# Patient Record
Sex: FEMALE | Race: WHITE | NOT HISPANIC OR LATINO
[De-identification: names, ages, dates, MRNs, and addresses within clinical notes are randomized per-mention and may not be internally consistent; named-entity substitution may affect disease eponyms.]

---

## 2017-09-08 ENCOUNTER — RECORD ABSTRACTING (OUTPATIENT)
Age: 77
End: 2017-09-08

## 2017-09-15 ENCOUNTER — APPOINTMENT (OUTPATIENT)
Dept: NEUROLOGY | Facility: CLINIC | Age: 77
End: 2017-09-15
Payer: MEDICARE

## 2017-09-15 PROCEDURE — 93892 TCD EMBOLI DETECT W/O INJ: CPT

## 2017-09-15 PROCEDURE — 93880 EXTRACRANIAL BILAT STUDY: CPT

## 2017-09-15 PROCEDURE — 93886 INTRACRANIAL COMPLETE STUDY: CPT

## 2017-09-22 ENCOUNTER — APPOINTMENT (OUTPATIENT)
Dept: NEUROLOGY | Facility: CLINIC | Age: 77
End: 2017-09-22
Payer: MEDICARE

## 2017-09-22 VITALS
WEIGHT: 252 LBS | SYSTOLIC BLOOD PRESSURE: 170 MMHG | HEIGHT: 66 IN | DIASTOLIC BLOOD PRESSURE: 100 MMHG | BODY MASS INDEX: 40.5 KG/M2 | HEART RATE: 76 BPM

## 2017-09-22 VITALS — WEIGHT: 252 LBS | BODY MASS INDEX: 41.48 KG/M2 | HEIGHT: 65.5 IN

## 2017-09-22 VITALS — SYSTOLIC BLOOD PRESSURE: 171 MMHG | DIASTOLIC BLOOD PRESSURE: 98 MMHG | HEART RATE: 76 BPM

## 2017-09-22 PROCEDURE — 99205 OFFICE O/P NEW HI 60 MIN: CPT

## 2018-03-03 ENCOUNTER — RECORD ABSTRACTING (OUTPATIENT)
Age: 78
End: 2018-03-03

## 2018-04-03 ENCOUNTER — APPOINTMENT (OUTPATIENT)
Dept: NEUROLOGY | Facility: CLINIC | Age: 78
End: 2018-04-03
Payer: MEDICARE

## 2018-04-03 VITALS
SYSTOLIC BLOOD PRESSURE: 175 MMHG | WEIGHT: 250 LBS | HEART RATE: 78 BPM | HEIGHT: 66 IN | DIASTOLIC BLOOD PRESSURE: 95 MMHG | BODY MASS INDEX: 40.18 KG/M2

## 2018-04-03 VITALS — DIASTOLIC BLOOD PRESSURE: 96 MMHG | SYSTOLIC BLOOD PRESSURE: 176 MMHG | HEART RATE: 78 BPM

## 2018-04-03 VITALS — WEIGHT: 250 LBS | HEIGHT: 66 IN | BODY MASS INDEX: 40.18 KG/M2

## 2018-04-03 PROCEDURE — 99215 OFFICE O/P EST HI 40 MIN: CPT

## 2018-04-03 RX ORDER — ENALAPRIL MALEATE 20 MG/1
20 TABLET ORAL TWICE DAILY
Refills: 0 | Status: ACTIVE | COMMUNITY

## 2018-04-19 ENCOUNTER — APPOINTMENT (OUTPATIENT)
Dept: NEUROLOGY | Facility: CLINIC | Age: 78
End: 2018-04-19
Payer: MEDICARE

## 2018-04-19 PROCEDURE — 93892 TCD EMBOLI DETECT W/O INJ: CPT

## 2018-04-19 PROCEDURE — 93886 INTRACRANIAL COMPLETE STUDY: CPT

## 2018-04-19 PROCEDURE — 93880 EXTRACRANIAL BILAT STUDY: CPT

## 2018-10-08 ENCOUNTER — APPOINTMENT (OUTPATIENT)
Dept: NEUROLOGY | Facility: CLINIC | Age: 78
End: 2018-10-08

## 2018-10-17 ENCOUNTER — APPOINTMENT (OUTPATIENT)
Dept: NEUROLOGY | Facility: CLINIC | Age: 78
End: 2018-10-17
Payer: MEDICARE

## 2018-10-17 PROCEDURE — 93880 EXTRACRANIAL BILAT STUDY: CPT

## 2018-10-18 ENCOUNTER — RECORD ABSTRACTING (OUTPATIENT)
Age: 78
End: 2018-10-18

## 2018-10-19 ENCOUNTER — APPOINTMENT (OUTPATIENT)
Dept: NEUROLOGY | Facility: CLINIC | Age: 78
End: 2018-10-19
Payer: MEDICARE

## 2018-10-19 VITALS
HEART RATE: 76 BPM | BODY MASS INDEX: 41.46 KG/M2 | DIASTOLIC BLOOD PRESSURE: 79 MMHG | WEIGHT: 258 LBS | SYSTOLIC BLOOD PRESSURE: 170 MMHG | HEIGHT: 66 IN

## 2018-10-19 VITALS
WEIGHT: 258 LBS | SYSTOLIC BLOOD PRESSURE: 170 MMHG | HEART RATE: 6 BPM | DIASTOLIC BLOOD PRESSURE: 80 MMHG | BODY MASS INDEX: 41.46 KG/M2 | HEIGHT: 66 IN

## 2018-10-19 VITALS — HEART RATE: 76 BPM

## 2018-10-19 PROCEDURE — 99215 OFFICE O/P EST HI 40 MIN: CPT

## 2018-10-19 RX ORDER — DICLOFENAC SODIUM 10 MG/G
1 GEL TOPICAL
Qty: 100 | Refills: 0 | Status: ACTIVE | COMMUNITY
Start: 2018-06-25

## 2019-01-20 ENCOUNTER — RECORD ABSTRACTING (OUTPATIENT)
Age: 79
End: 2019-01-20

## 2019-03-12 ENCOUNTER — APPOINTMENT (OUTPATIENT)
Dept: NEUROLOGY | Facility: CLINIC | Age: 79
End: 2019-03-12
Payer: MEDICARE

## 2019-03-12 VITALS
HEART RATE: 81 BPM | DIASTOLIC BLOOD PRESSURE: 95 MMHG | WEIGHT: 260 LBS | BODY MASS INDEX: 40.81 KG/M2 | HEIGHT: 67 IN | SYSTOLIC BLOOD PRESSURE: 167 MMHG

## 2019-03-12 VITALS
BODY MASS INDEX: 40.81 KG/M2 | HEIGHT: 67 IN | SYSTOLIC BLOOD PRESSURE: 167 MMHG | DIASTOLIC BLOOD PRESSURE: 95 MMHG | HEART RATE: 81 BPM | WEIGHT: 260 LBS

## 2019-03-12 DIAGNOSIS — G47.33 OBSTRUCTIVE SLEEP APNEA (ADULT) (PEDIATRIC): ICD-10-CM

## 2019-03-12 PROCEDURE — 99215 OFFICE O/P EST HI 40 MIN: CPT
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
## 2019-03-12 NOTE — REVIEW OF SYSTEMS
[Feeling Tired] : feeling tired [Dysuria] : no dysuria [Negative] : ENT [FreeTextEntry2] :   probable snoring; some daytime somnolence [FreeTextEntry9] : joint weakness-unstable left knee

## 2019-03-19 ENCOUNTER — APPOINTMENT (OUTPATIENT)
Dept: NEUROLOGY | Facility: CLINIC | Age: 79
End: 2019-03-19
Payer: MEDICARE

## 2019-03-19 ENCOUNTER — APPOINTMENT (OUTPATIENT)
Dept: NEUROLOGY | Facility: CLINIC | Age: 79
End: 2019-03-19

## 2019-03-19 PROCEDURE — 93880 EXTRACRANIAL BILAT STUDY: CPT

## 2019-04-08 ENCOUNTER — APPOINTMENT (OUTPATIENT)
Dept: NEUROLOGY | Facility: CLINIC | Age: 79
End: 2019-04-08

## 2019-05-10 ENCOUNTER — OUTPATIENT (OUTPATIENT)
Dept: OUTPATIENT SERVICES | Facility: HOSPITAL | Age: 79
LOS: 1 days | End: 2019-05-10
Payer: MEDICARE

## 2019-05-10 VITALS
WEIGHT: 255.07 LBS | SYSTOLIC BLOOD PRESSURE: 166 MMHG | DIASTOLIC BLOOD PRESSURE: 90 MMHG | HEART RATE: 72 BPM | HEIGHT: 65 IN | RESPIRATION RATE: 18 BRPM | TEMPERATURE: 99 F | OXYGEN SATURATION: 96 %

## 2019-05-10 DIAGNOSIS — Z98.890 OTHER SPECIFIED POSTPROCEDURAL STATES: Chronic | ICD-10-CM

## 2019-05-10 DIAGNOSIS — I10 ESSENTIAL (PRIMARY) HYPERTENSION: ICD-10-CM

## 2019-05-10 DIAGNOSIS — Z29.9 ENCOUNTER FOR PROPHYLACTIC MEASURES, UNSPECIFIED: ICD-10-CM

## 2019-05-10 DIAGNOSIS — Z01.818 ENCOUNTER FOR OTHER PREPROCEDURAL EXAMINATION: ICD-10-CM

## 2019-05-10 DIAGNOSIS — M51.26 OTHER INTERVERTEBRAL DISC DISPLACEMENT, LUMBAR REGION: ICD-10-CM

## 2019-05-10 DIAGNOSIS — Z90.89 ACQUIRED ABSENCE OF OTHER ORGANS: Chronic | ICD-10-CM

## 2019-05-10 LAB
ANION GAP SERPL CALC-SCNC: 13 MMOL/L — SIGNIFICANT CHANGE UP (ref 5–17)
BUN SERPL-MCNC: 18 MG/DL — SIGNIFICANT CHANGE UP (ref 7–23)
CALCIUM SERPL-MCNC: 9.6 MG/DL — SIGNIFICANT CHANGE UP (ref 8.4–10.5)
CHLORIDE SERPL-SCNC: 100 MMOL/L — SIGNIFICANT CHANGE UP (ref 96–108)
CO2 SERPL-SCNC: 25 MMOL/L — SIGNIFICANT CHANGE UP (ref 22–31)
CREAT SERPL-MCNC: 0.65 MG/DL — SIGNIFICANT CHANGE UP (ref 0.5–1.3)
GLUCOSE SERPL-MCNC: 105 MG/DL — HIGH (ref 70–99)
HCT VFR BLD CALC: 39.1 % — SIGNIFICANT CHANGE UP (ref 34.5–45)
HGB BLD-MCNC: 12.5 G/DL — SIGNIFICANT CHANGE UP (ref 11.5–15.5)
MCHC RBC-ENTMCNC: 29.4 PG — SIGNIFICANT CHANGE UP (ref 27–34)
MCHC RBC-ENTMCNC: 32 GM/DL — SIGNIFICANT CHANGE UP (ref 32–36)
MCV RBC AUTO: 92 FL — SIGNIFICANT CHANGE UP (ref 80–100)
PLATELET # BLD AUTO: 264 K/UL — SIGNIFICANT CHANGE UP (ref 150–400)
POTASSIUM SERPL-MCNC: 3.9 MMOL/L — SIGNIFICANT CHANGE UP (ref 3.5–5.3)
POTASSIUM SERPL-SCNC: 3.9 MMOL/L — SIGNIFICANT CHANGE UP (ref 3.5–5.3)
RBC # BLD: 4.25 M/UL — SIGNIFICANT CHANGE UP (ref 3.8–5.2)
RBC # FLD: 14.2 % — SIGNIFICANT CHANGE UP (ref 10.3–14.5)
SODIUM SERPL-SCNC: 138 MMOL/L — SIGNIFICANT CHANGE UP (ref 135–145)
WBC # BLD: 8.39 K/UL — SIGNIFICANT CHANGE UP (ref 3.8–10.5)
WBC # FLD AUTO: 8.39 K/UL — SIGNIFICANT CHANGE UP (ref 3.8–10.5)

## 2019-05-10 PROCEDURE — 87640 STAPH A DNA AMP PROBE: CPT

## 2019-05-10 PROCEDURE — 87641 MR-STAPH DNA AMP PROBE: CPT

## 2019-05-10 PROCEDURE — 80048 BASIC METABOLIC PNL TOTAL CA: CPT

## 2019-05-10 PROCEDURE — 85027 COMPLETE CBC AUTOMATED: CPT

## 2019-05-10 PROCEDURE — G0463: CPT

## 2019-05-10 RX ORDER — CHLORHEXIDINE GLUCONATE 213 G/1000ML
1 SOLUTION TOPICAL ONCE
Refills: 0 | Status: DISCONTINUED | OUTPATIENT
Start: 2019-05-22 | End: 2019-05-22

## 2019-05-10 RX ORDER — SODIUM CHLORIDE 9 MG/ML
3 INJECTION INTRAMUSCULAR; INTRAVENOUS; SUBCUTANEOUS EVERY 8 HOURS
Refills: 0 | Status: DISCONTINUED | OUTPATIENT
Start: 2019-05-22 | End: 2019-05-22

## 2019-05-10 RX ORDER — VANCOMYCIN HCL 1 G
1500 VIAL (EA) INTRAVENOUS ONCE
Refills: 0 | Status: COMPLETED | OUTPATIENT
Start: 2019-05-22 | End: 2019-05-22

## 2019-05-10 RX ORDER — LIDOCAINE HCL 20 MG/ML
0.2 VIAL (ML) INJECTION ONCE
Refills: 0 | Status: DISCONTINUED | OUTPATIENT
Start: 2019-05-22 | End: 2019-05-22

## 2019-05-10 NOTE — H&P PST ADULT - NSANTHOSAYNRD_GEN_A_CORE
No. TANJA screening performed.  STOP BANG Legend: 0-2 = LOW Risk; 3-4 = INTERMEDIATE Risk; 5-8 = HIGH Risk

## 2019-05-10 NOTE — H&P PST ADULT - ASSESSMENT
other intervertebral disc displacement, lumbar region  CAPRINI VTE 2.0 SCORE [CLOT updated 2019]    AGE RELATED RISK FACTORS                                                       MOBILITY RELATED FACTORS  [ ] Age 41-60 years                                            (1 Point)                    [ ] Bed rest                                                        (1 Point)  [ ] Age: 61-74 years                                           (2 Points)                  [ ] Plaster cast                                                   (2 Points)  [xx ] Age= 75 years                                              (3 Points)                    [ ] Bed bound for more than 72 hours                 (2 Points)    DISEASE RELATED RISK FACTORS                                               GENDER SPECIFIC FACTORS  [ ] Edema in the lower extremities                       (1 Point)              [ ] Pregnancy                                                     (1 Point)  [ ] Varicose veins                                               (1 Point)                     [ ] Post-partum < 6 weeks                                   (1 Point)             [xx ] BMI > 25 Kg/m2                                            (1 Point)                     [ ] Hormonal therapy  or oral contraception          (1 Point)                 [ ] Sepsis (in the previous month)                        (1 Point)               [ ] History of pregnancy complications                 (1 point)  [ ] Pneumonia or serious lung disease                                               [ ] Unexplained or recurrent                     (1 Point)           (in the previous month)                               (1 Point)  [ ] Abnormal pulmonary function test                     (1 Point)                 SURGERY RELATED RISK FACTORS  [ ] Acute myocardial infarction                              (1 Point)               [ ]  Section                                             (1 Point)  [ ] Congestive heart failure (in the previous month)  (1 Point)      [ ] Minor surgery                                                  (1 Point)   [ ] Inflammatory bowel disease                             (1 Point)               [ ] Arthroscopic surgery                                        (2 Points)  [ ] Central venous access                                      (2 Points)                [xx ] General surgery lasting more than 45 minutes (2 points)  [ ] Malignancy- Present or previous                   (2 Points)                [ ] Elective arthroplasty                                         (5 points)    [ ] Stroke (in the previous month)                          (5 Points)                                                                                                                                                           HEMATOLOGY RELATED FACTORS                                                 TRAUMA RELATED RISK FACTORS  [ ] Prior episodes of VTE                                     (3 Points)                [ ] Fracture of the hip, pelvis, or leg                       (5 Points)  [ ] Positive family history for VTE                         (3 Points)             [ ] Acute spinal cord injury (in the previous month)  (5 Points)  [ ] Prothrombin 29776 A                                     (3 Points)               [ ] Paralysis  (less than 1 month)                             (5 Points)  [ ] Factor V Leiden                                             (3 Points)                  [ ] Multiple Trauma within 1 month                        (5 Points)  [ ] Lupus anticoagulants                                     (3 Points)                                                           [ ] Anticardiolipin antibodies                               (3 Points)                                                       [ ] High homocysteine in the blood                      (3 Points)                                             [ ] Other congenital or acquired thrombophilia      (3 Points)                                                [ ] Heparin induced thrombocytopenia                  (3 Points)                                     Total Score [     5     ]

## 2019-05-10 NOTE — H&P PST ADULT - HISTORY OF PRESENT ILLNESS
This is a 77 y/o female This is a 77 y/o female with history of obesity and HTN,  c/o back pain with weakness lower extremities with frequent falls, seen orthopedics for evaluation, recommended surgery. Pt on ASA 81 mg TIW prophylactic , c/o nose bleed at times . 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 This is a 77 y/o female with history of obesity and HTN,  c/o back pain with weakness lower extremities with frequent falls, seen orthopedics for evaluation, recommended surgery. Pt on ASA 81 mg TIW prophylactic , c/o epistaxis  at times . 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

## 2019-05-10 NOTE — H&P PST ADULT - NSICDXFAMILYHX_GEN_ALL_CORE_FT
FAMILY HISTORY:  Family history of breast cancer  Family history of pancreatic cancer  FH: diabetes mellitus

## 2019-05-10 NOTE — H&P PST ADULT - NSICDXPASTSURGICALHX_GEN_ALL_CORE_FT
PAST SURGICAL HISTORY:  H/O laparoscopy     History of hernia surgery     S/P dilation and curettage     S/P tonsillectomy and adenoidectomy

## 2019-05-10 NOTE — H&P PST ADULT - NSICDXPROBLEM_GEN_ALL_CORE_FT
PROBLEM DIAGNOSES  Problem: Other intervertebral disc displacement, lumbar region  Assessment and Plan: left L3-L4 discectomy    Problem: Hypertension  Assessment and Plan: continue meds    Problem: Need for prophylactic measure  Assessment and Plan: The Caprini score indicates this patient is at risk for a VTE event (score 3-5).  Most surgical patients in this group would benefit from pharmacologic prophylaxis.  The surgical team will determine the balance between VTE risk and bleeding risk

## 2019-05-10 NOTE — H&P PST ADULT - NSICDXPASTMEDICALHX_GEN_ALL_CORE_FT
PAST MEDICAL HISTORY:  Hypertension controlled    MVP (mitral valve prolapse) PAST MEDICAL HISTORY:  Carotid stenosis less than 40 % stenosis bilateral    Hypertension controlled    Morbid obesity     MVP (mitral valve prolapse) PAST MEDICAL HISTORY:  Carotid stenosis less than 40 % stenosis bilateral    Hypertension controlled    Morbid obesity     MVP (mitral valve prolapse) echo 2015 , no SBE

## 2019-05-11 LAB
MRSA PCR RESULT.: SIGNIFICANT CHANGE UP
S AUREUS DNA NOSE QL NAA+PROBE: SIGNIFICANT CHANGE UP

## 2019-05-21 ENCOUNTER — TRANSCRIPTION ENCOUNTER (OUTPATIENT)
Age: 79
End: 2019-05-21

## 2019-05-22 ENCOUNTER — INPATIENT (INPATIENT)
Facility: HOSPITAL | Age: 79
LOS: 0 days | Discharge: HOME | DRG: 519 | End: 2019-05-23
Attending: NEUROLOGICAL SURGERY | Admitting: NEUROLOGICAL SURGERY
Payer: MEDICARE

## 2019-05-22 VITALS
TEMPERATURE: 98 F | OXYGEN SATURATION: 95 % | RESPIRATION RATE: 18 BRPM | SYSTOLIC BLOOD PRESSURE: 147 MMHG | HEART RATE: 90 BPM | DIASTOLIC BLOOD PRESSURE: 85 MMHG | WEIGHT: 255.07 LBS | HEIGHT: 65 IN

## 2019-05-22 DIAGNOSIS — Z98.890 OTHER SPECIFIED POSTPROCEDURAL STATES: Chronic | ICD-10-CM

## 2019-05-22 DIAGNOSIS — M51.26 OTHER INTERVERTEBRAL DISC DISPLACEMENT, LUMBAR REGION: ICD-10-CM

## 2019-05-22 DIAGNOSIS — Z90.89 ACQUIRED ABSENCE OF OTHER ORGANS: Chronic | ICD-10-CM

## 2019-05-22 LAB
ANION GAP SERPL CALC-SCNC: 15 MMOL/L — SIGNIFICANT CHANGE UP (ref 5–17)
BASOPHILS # BLD AUTO: 0 K/UL — SIGNIFICANT CHANGE UP (ref 0–0.2)
BASOPHILS NFR BLD AUTO: 0 % — SIGNIFICANT CHANGE UP (ref 0–2)
BUN SERPL-MCNC: 16 MG/DL — SIGNIFICANT CHANGE UP (ref 7–23)
CALCIUM SERPL-MCNC: 8.8 MG/DL — SIGNIFICANT CHANGE UP (ref 8.4–10.5)
CHLORIDE SERPL-SCNC: 100 MMOL/L — SIGNIFICANT CHANGE UP (ref 96–108)
CO2 SERPL-SCNC: 23 MMOL/L — SIGNIFICANT CHANGE UP (ref 22–31)
CREAT SERPL-MCNC: 0.72 MG/DL — SIGNIFICANT CHANGE UP (ref 0.5–1.3)
EOSINOPHIL # BLD AUTO: 0 K/UL — SIGNIFICANT CHANGE UP (ref 0–0.5)
EOSINOPHIL NFR BLD AUTO: 0.3 % — SIGNIFICANT CHANGE UP (ref 0–6)
GLUCOSE SERPL-MCNC: 155 MG/DL — HIGH (ref 70–99)
HCT VFR BLD CALC: 38.2 % — SIGNIFICANT CHANGE UP (ref 34.5–45)
HGB BLD-MCNC: 12.4 G/DL — SIGNIFICANT CHANGE UP (ref 11.5–15.5)
LYMPHOCYTES # BLD AUTO: 0.8 K/UL — LOW (ref 1–3.3)
LYMPHOCYTES # BLD AUTO: 4.6 % — LOW (ref 13–44)
MCHC RBC-ENTMCNC: 29.9 PG — SIGNIFICANT CHANGE UP (ref 27–34)
MCHC RBC-ENTMCNC: 32.6 GM/DL — SIGNIFICANT CHANGE UP (ref 32–36)
MCV RBC AUTO: 91.7 FL — SIGNIFICANT CHANGE UP (ref 80–100)
MONOCYTES # BLD AUTO: 0.1 K/UL — SIGNIFICANT CHANGE UP (ref 0–0.9)
MONOCYTES NFR BLD AUTO: 0.7 % — LOW (ref 2–14)
NEUTROPHILS # BLD AUTO: 15.6 K/UL — HIGH (ref 1.8–7.4)
NEUTROPHILS NFR BLD AUTO: 94.3 % — HIGH (ref 43–77)
PLATELET # BLD AUTO: 243 K/UL — SIGNIFICANT CHANGE UP (ref 150–400)
POTASSIUM SERPL-MCNC: 3.8 MMOL/L — SIGNIFICANT CHANGE UP (ref 3.5–5.3)
POTASSIUM SERPL-SCNC: 3.8 MMOL/L — SIGNIFICANT CHANGE UP (ref 3.5–5.3)
RBC # BLD: 4.17 M/UL — SIGNIFICANT CHANGE UP (ref 3.8–5.2)
RBC # FLD: 12.9 % — SIGNIFICANT CHANGE UP (ref 10.3–14.5)
SODIUM SERPL-SCNC: 138 MMOL/L — SIGNIFICANT CHANGE UP (ref 135–145)
WBC # BLD: 16.5 K/UL — HIGH (ref 3.8–10.5)
WBC # FLD AUTO: 16.5 K/UL — HIGH (ref 3.8–10.5)

## 2019-05-22 RX ORDER — ACETAMINOPHEN 500 MG
650 TABLET ORAL EVERY 6 HOURS
Refills: 0 | Status: DISCONTINUED | OUTPATIENT
Start: 2019-05-22 | End: 2019-05-23

## 2019-05-22 RX ORDER — HYDROMORPHONE HYDROCHLORIDE 2 MG/ML
0.25 INJECTION INTRAMUSCULAR; INTRAVENOUS; SUBCUTANEOUS
Refills: 0 | Status: DISCONTINUED | OUTPATIENT
Start: 2019-05-22 | End: 2019-05-22

## 2019-05-22 RX ORDER — ONDANSETRON 8 MG/1
4 TABLET, FILM COATED ORAL ONCE
Refills: 0 | Status: DISCONTINUED | OUTPATIENT
Start: 2019-05-22 | End: 2019-05-22

## 2019-05-22 RX ORDER — VANCOMYCIN HCL 1 G
1500 VIAL (EA) INTRAVENOUS ONCE
Refills: 0 | Status: COMPLETED | OUTPATIENT
Start: 2019-05-23 | End: 2019-05-23

## 2019-05-22 RX ORDER — DOCUSATE SODIUM 100 MG
100 CAPSULE ORAL THREE TIMES A DAY
Refills: 0 | Status: DISCONTINUED | OUTPATIENT
Start: 2019-05-22 | End: 2019-05-23

## 2019-05-22 RX ORDER — DEXAMETHASONE 0.5 MG/5ML
4 ELIXIR ORAL EVERY 6 HOURS
Refills: 0 | Status: COMPLETED | OUTPATIENT
Start: 2019-05-22 | End: 2019-05-23

## 2019-05-22 RX ORDER — OXYCODONE HYDROCHLORIDE 5 MG/1
10 TABLET ORAL EVERY 4 HOURS
Refills: 0 | Status: DISCONTINUED | OUTPATIENT
Start: 2019-05-22 | End: 2019-05-23

## 2019-05-22 RX ORDER — OXYCODONE HYDROCHLORIDE 5 MG/1
5 TABLET ORAL EVERY 4 HOURS
Refills: 0 | Status: DISCONTINUED | OUTPATIENT
Start: 2019-05-22 | End: 2019-05-23

## 2019-05-22 RX ORDER — SENNA PLUS 8.6 MG/1
2 TABLET ORAL AT BEDTIME
Refills: 0 | Status: DISCONTINUED | OUTPATIENT
Start: 2019-05-22 | End: 2019-05-23

## 2019-05-22 RX ORDER — DEXTROSE MONOHYDRATE, SODIUM CHLORIDE, AND POTASSIUM CHLORIDE 50; .745; 4.5 G/1000ML; G/1000ML; G/1000ML
1000 INJECTION, SOLUTION INTRAVENOUS
Refills: 0 | Status: DISCONTINUED | OUTPATIENT
Start: 2019-05-22 | End: 2019-05-23

## 2019-05-22 RX ADMIN — HYDROMORPHONE HYDROCHLORIDE 0.25 MILLIGRAM(S): 2 INJECTION INTRAMUSCULAR; INTRAVENOUS; SUBCUTANEOUS at 20:00

## 2019-05-22 RX ADMIN — SODIUM CHLORIDE 3 MILLILITER(S): 9 INJECTION INTRAMUSCULAR; INTRAVENOUS; SUBCUTANEOUS at 20:29

## 2019-05-22 RX ADMIN — HYDROMORPHONE HYDROCHLORIDE 0.25 MILLIGRAM(S): 2 INJECTION INTRAMUSCULAR; INTRAVENOUS; SUBCUTANEOUS at 19:52

## 2019-05-22 NOTE — BRIEF OPERATIVE NOTE - NSICDXBRIEFPREOP_GEN_ALL_CORE_FT
PRE-OP DIAGNOSIS:  Lumbar herniated disc 22-May-2019 18:45:29  Jimmy Anderson  Lumbar herniated disc 22-May-2019 18:45:21  Jimmy Anderson

## 2019-05-23 ENCOUNTER — TRANSCRIPTION ENCOUNTER (OUTPATIENT)
Age: 79
End: 2019-05-23

## 2019-05-23 VITALS — SYSTOLIC BLOOD PRESSURE: 126 MMHG | DIASTOLIC BLOOD PRESSURE: 69 MMHG | OXYGEN SATURATION: 92 % | HEART RATE: 99 BPM

## 2019-05-23 LAB
ANION GAP SERPL CALC-SCNC: 12 MMOL/L — SIGNIFICANT CHANGE UP (ref 5–17)
BASOPHILS # BLD AUTO: 0.01 K/UL — SIGNIFICANT CHANGE UP (ref 0–0.2)
BASOPHILS NFR BLD AUTO: 0.1 % — SIGNIFICANT CHANGE UP (ref 0–2)
BUN SERPL-MCNC: 15 MG/DL — SIGNIFICANT CHANGE UP (ref 7–23)
CALCIUM SERPL-MCNC: 8.9 MG/DL — SIGNIFICANT CHANGE UP (ref 8.4–10.5)
CHLORIDE SERPL-SCNC: 101 MMOL/L — SIGNIFICANT CHANGE UP (ref 96–108)
CO2 SERPL-SCNC: 26 MMOL/L — SIGNIFICANT CHANGE UP (ref 22–31)
CREAT SERPL-MCNC: 0.68 MG/DL — SIGNIFICANT CHANGE UP (ref 0.5–1.3)
EOSINOPHIL # BLD AUTO: 0 K/UL — SIGNIFICANT CHANGE UP (ref 0–0.5)
EOSINOPHIL NFR BLD AUTO: 0 % — SIGNIFICANT CHANGE UP (ref 0–6)
GLUCOSE SERPL-MCNC: 201 MG/DL — HIGH (ref 70–99)
HCT VFR BLD CALC: 37.4 % — SIGNIFICANT CHANGE UP (ref 34.5–45)
HGB BLD-MCNC: 12.1 G/DL — SIGNIFICANT CHANGE UP (ref 11.5–15.5)
IMM GRANULOCYTES NFR BLD AUTO: 0.3 % — SIGNIFICANT CHANGE UP (ref 0–1.5)
LYMPHOCYTES # BLD AUTO: 0.6 K/UL — LOW (ref 1–3.3)
LYMPHOCYTES # BLD AUTO: 4.6 % — LOW (ref 13–44)
MCHC RBC-ENTMCNC: 29.7 PG — SIGNIFICANT CHANGE UP (ref 27–34)
MCHC RBC-ENTMCNC: 32.4 GM/DL — SIGNIFICANT CHANGE UP (ref 32–36)
MCV RBC AUTO: 91.7 FL — SIGNIFICANT CHANGE UP (ref 80–100)
MONOCYTES # BLD AUTO: 0.33 K/UL — SIGNIFICANT CHANGE UP (ref 0–0.9)
MONOCYTES NFR BLD AUTO: 2.5 % — SIGNIFICANT CHANGE UP (ref 2–14)
NEUTROPHILS # BLD AUTO: 12.1 K/UL — HIGH (ref 1.8–7.4)
NEUTROPHILS NFR BLD AUTO: 92.5 % — HIGH (ref 43–77)
PLATELET # BLD AUTO: 236 K/UL — SIGNIFICANT CHANGE UP (ref 150–400)
POTASSIUM SERPL-MCNC: 4.3 MMOL/L — SIGNIFICANT CHANGE UP (ref 3.5–5.3)
POTASSIUM SERPL-SCNC: 4.3 MMOL/L — SIGNIFICANT CHANGE UP (ref 3.5–5.3)
RBC # BLD: 4.08 M/UL — SIGNIFICANT CHANGE UP (ref 3.8–5.2)
RBC # FLD: 13.8 % — SIGNIFICANT CHANGE UP (ref 10.3–14.5)
SODIUM SERPL-SCNC: 139 MMOL/L — SIGNIFICANT CHANGE UP (ref 135–145)
WBC # BLD: 13.08 K/UL — HIGH (ref 3.8–10.5)
WBC # FLD AUTO: 13.08 K/UL — HIGH (ref 3.8–10.5)

## 2019-05-23 PROCEDURE — 97161 PT EVAL LOW COMPLEX 20 MIN: CPT

## 2019-05-23 PROCEDURE — 76000 FLUOROSCOPY <1 HR PHYS/QHP: CPT

## 2019-05-23 PROCEDURE — 85027 COMPLETE CBC AUTOMATED: CPT

## 2019-05-23 PROCEDURE — C1889: CPT

## 2019-05-23 PROCEDURE — 80048 BASIC METABOLIC PNL TOTAL CA: CPT

## 2019-05-23 RX ORDER — MILK THISTLE 150 MG
1 CAPSULE ORAL
Qty: 0 | Refills: 0 | DISCHARGE

## 2019-05-23 RX ORDER — DOCUSATE SODIUM 100 MG
1 CAPSULE ORAL
Qty: 0 | Refills: 0 | DISCHARGE
Start: 2019-05-23

## 2019-05-23 RX ORDER — ACETAMINOPHEN 500 MG
2 TABLET ORAL
Qty: 0 | Refills: 0 | DISCHARGE
Start: 2019-05-23

## 2019-05-23 RX ORDER — SENNA PLUS 8.6 MG/1
2 TABLET ORAL
Qty: 0 | Refills: 0 | DISCHARGE
Start: 2019-05-23

## 2019-05-23 RX ORDER — OXYCODONE HYDROCHLORIDE 5 MG/1
1 TABLET ORAL
Qty: 0 | Refills: 0 | DISCHARGE
Start: 2019-05-23

## 2019-05-23 RX ORDER — OMEGA-3 ACID ETHYL ESTERS 1 G
1 CAPSULE ORAL
Qty: 0 | Refills: 0 | DISCHARGE

## 2019-05-23 RX ORDER — OXYCODONE HYDROCHLORIDE 5 MG/1
1 TABLET ORAL
Qty: 20 | Refills: 0
Start: 2019-05-23

## 2019-05-23 RX ORDER — MAGNESIUM GLYCINATE 100 MG
1 CAPSULE ORAL
Qty: 0 | Refills: 0 | DISCHARGE

## 2019-05-23 RX ORDER — ASPIRIN/CALCIUM CARB/MAGNESIUM 324 MG
1 TABLET ORAL
Qty: 0 | Refills: 0 | DISCHARGE

## 2019-05-23 RX ADMIN — Medication 20 MILLIGRAM(S): at 05:06

## 2019-05-23 RX ADMIN — OXYCODONE HYDROCHLORIDE 5 MILLIGRAM(S): 5 TABLET ORAL at 05:36

## 2019-05-23 RX ADMIN — Medication 300 MILLIGRAM(S): at 02:59

## 2019-05-23 RX ADMIN — Medication 4 MILLIGRAM(S): at 05:05

## 2019-05-23 RX ADMIN — Medication 4 MILLIGRAM(S): at 13:09

## 2019-05-23 RX ADMIN — OXYCODONE HYDROCHLORIDE 5 MILLIGRAM(S): 5 TABLET ORAL at 05:06

## 2019-05-23 RX ADMIN — Medication 4 MILLIGRAM(S): at 00:54

## 2019-05-23 NOTE — DISCHARGE NOTE PROVIDER - NSDCCPCAREPLAN_GEN_ALL_CORE_FT
PRINCIPAL DISCHARGE DIAGNOSIS  Diagnosis: Other intervertebral disc displacement, lumbar region  Assessment and Plan of Treatment: 5/22 l3 metrx laminectomy      SECONDARY DISCHARGE DIAGNOSES  Diagnosis: Hypertension  Assessment and Plan of Treatment: continue home medication

## 2019-05-23 NOTE — DISCHARGE NOTE PROVIDER - NSDCACTIVITY_GEN_ALL_CORE
Walking - Indoors allowed/Stairs allowed/Showering allowed/Do not make important decisions/No heavy lifting/straining/Walking - Outdoors allowed/Do not drive or operate machinery

## 2019-05-23 NOTE — PROVIDER CONTACT NOTE (OTHER) - ASSESSMENT
pt straight cath at 2330, next Dtv by 0730- this morning pt was incontinent x2 on pad in bed and went on bed pan 150ml, pt asking to void again on bedpan as soon as it was removed. bladder scan revealed 586ml, pt asking if her HOb can be raised so she can try to empty bladder better while sitting up

## 2019-05-23 NOTE — DISCHARGE NOTE PROVIDER - HOSPITAL COURSE
This is a 78 year old female with history of obesity and hypertension, complaining of back pain with weakness lower extremities with frequent falls, seen orthopedics for evaluation, recommended surgery. Patient admitted 5/22/2019 and had a l3 metrx laminectomy.  Patient was flat till noon on 5/23.  No headaches.  Patient reported improvement of her pre operative symptoms.  Patient seen by physical therapy.  On day of discharge patient is medically and neurologically stable for discharge. This is a 78 year old female with history of obesity and hypertension, complaining of back pain with weakness lower extremities with frequent falls, seen orthopedics for evaluation, recommended surgery. Patient admitted 5/22/2019 and had a l3 metrx laminectomy.  Patient was flat till noon on 5/23.  No headaches.  Patient reported improvement of her pre operative symptoms.  Patient seen by physical therapy and recommended for outpatient physical therapy.  On day of discharge patient is medically and neurologically stable for discharge.

## 2019-05-23 NOTE — CONSULT NOTE ADULT - SUBJECTIVE AND OBJECTIVE BOX
see NS notes        INTERVAL HPI/OVERNIGHT EVENTS:    MEDICATIONS  (STANDING):  dexamethasone  Injectable 4 milliGRAM(s) IV Push every 6 hours  docusate sodium 100 milliGRAM(s) Oral three times a day  enalapril 20 milliGRAM(s) Oral two times a day  senna 2 Tablet(s) Oral at bedtime  sodium chloride 0.9% with potassium chloride 20 mEq/L 1000 milliLiter(s) (75 mL/Hr) IV Continuous <Continuous>    MEDICATIONS  (PRN):  acetaminophen   Tablet .. 650 milliGRAM(s) Oral every 6 hours PRN Temp greater or equal to 38C (100.4F), Mild Pain (1 - 3)  oxyCODONE    IR 5 milliGRAM(s) Oral every 4 hours PRN Moderate Pain (4 - 6)  oxyCODONE    IR 10 milliGRAM(s) Oral every 4 hours PRN Severe Pain (7 - 10)          Allergies    adhesives (Other)  allergic to ACTH gel (Swelling; Rash)  Keflex (Hives)  penicillin (Hives)    Intolerances        REVIEW OF SYSTEMS:  CARDIOVASCULAR: No chest pain, palpitations, dizziness, or leg swelling; no shortness of breath     RESPIRATORY: No cough, wheezing, chills or hemoptysis; No shortness of breath    GASTROINTESTINAL: No abdominal or epigastric pain. No nausea, vomiting, or hematemesis; No diarrhea or constipation. No melena or hematochezia.    NEUROLOGICAL: No headaches, memory loss, loss of strength, numbness      PHYSICAL EXAM:  Vital Signs Last 24 Hrs  T(C): 37 (23 May 2019 04:48), Max: 37 (23 May 2019 04:48)  T(F): 98.6 (23 May 2019 04:48), Max: 98.6 (23 May 2019 04:48)  HR: 98 (23 May 2019 04:48) (87 - 110)  BP: 166/98 (23 May 2019 04:48) (133/77 - 166/98)  BP(mean): 100 (22 May 2019 21:00) (97 - 121)  RR: 18 (23 May 2019 04:48) (14 - 18)  SpO2: 95% (23 May 2019 04:48) (91% - 98%)    GENERAL: NAD, well-groomed, well-developed  HEAD:  Atraumatic, Normocephalic  EYES: EOMI, PERRLA, conjunctiva and sclera clear  NECK: Supple, No JVD, Normal thyroid  NERVOUS SYSTEM:  Alert & Oriented X3, Good concentration;  and symmetric  CHEST/LUNG: Clear to auscultation bilaterally; No rales, rhonchi, wheezing, or rubs  HEART: S1S2 regular, without murmur, rub nor gallop  ABDOMEN: Soft, Nontender, Nondistended; Bowel sounds present  EXTREMITIES:  2+ Peripheral Pulses, No clubbing, cyanosis, or edema      LABS:                        12.4   16.5  )-----------( 243      ( 22 May 2019 20:36 )             38.2     23 May 2019 07:02    139    |  101    |  15     ----------------------------<  201    4.3     |  26     |  0.68     Ca    8.9        23 May 2019 07:02           assessment:  POD 1  Leukocytosis secondary to steroids    Plan:   PT. D/C planning

## 2019-05-23 NOTE — DISCHARGE NOTE NURSING/CASE MANAGEMENT/SOCIAL WORK - NSDCDPATPORTLINK_GEN_ALL_CORE
You can access the [facility] Patient Portal, offered by [facility], by registering with the following website: [email or web address]

## 2019-05-23 NOTE — PROVIDER CONTACT NOTE (OTHER) - ACTION/TREATMENT ORDERED:
As per MD kirkland straight cath now, cannot raise HOB yet until clearance from Dr carlisle, pt understands, no iother interventions

## 2019-05-23 NOTE — PHYSICAL THERAPY INITIAL EVALUATION ADULT - ADDITIONAL COMMENTS
PTA Pt was independent with functional mobility and ADL's with RW. Pt lives in a  alone with 4 steps to enter.

## 2019-05-23 NOTE — PHYSICAL THERAPY INITIAL EVALUATION ADULT - PERTINENT HX OF CURRENT PROBLEM, REHAB EVAL
Pt is a 77 y/o female with history of obesity and HTN,  c/o back pain with weakness lower extremities with frequent falls, seen orthopedics for evaluation, recommended surgery. Pt on ASA 81 mg TIW prophylactic , c/o epistaxis  at times. Pt is s/p L3 laminectomy.

## 2019-05-23 NOTE — DISCHARGE NOTE PROVIDER - CARE PROVIDER_API CALL
Jonah Gunn)  Neurological Surgery  [address] [place] [address], [address]  [place] [address]  Phone: [phone]  Fax: [phone]  Follow Up Time:

## 2019-05-23 NOTE — PROGRESS NOTE ADULT - ATTENDING COMMENTS
Pt doing well.  No spinal headache.  Improved left leg strength and no left leg pain.  Cleared by PT for DC home.  RTC 1 week.

## 2019-05-23 NOTE — DISCHARGE NOTE PROVIDER - NSDCFUADDINST_GEN_ALL_CORE_FT
keep incision dry and clean  let attending know if pain not controlled by medication, drainage from incision, fever or change in mental status.

## 2019-05-23 NOTE — PROGRESS NOTE ADULT - ASSESSMENT
HPI:  This is a 79 y/o female with history of obesity and HTN,  c/o back pain with weakness lower extremities with frequent falls, seen orthopedics for evaluation, recommended surgery. Pt on ASA 81 mg TIW prophylactic , c/o epistaxis  at times . 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 (10 May 2019 15:50)    PROCEDURE: 5/22/2019 left metrx l3 laminectomy        PAST MEDICAL & SURGICAL HISTORY:  Carotid stenosis: less than 40 % stenosis bilateral  Morbid obesity  MVP (mitral valve prolapse): echo 2015 , no SBE  Hypertension: controlled  H/O laparoscopy  S/P dilation and curettage  History of hernia surgery  S/P tonsillectomy and adenoidectomy        PLAN:  Neuro: neuro checks q 4, vitals q 4, continue pain control with tylenol and oxy IR, flat till noon than out of bed   Respiratory: incentive spirometry o2 >90  CV: keep sbp 100-150 history of hypertension continue vasotec  Endocrine: euyglycemia  Heme/Onc:      stable, leukocytosis secondary to steroids improving       DVT ppx: scds, start lovenox tonight   Renal: ivl voiding  ID: [email or web address]  GI:   tolerating diet   PT/OT: after noon, patient wants to go home today.    Will discuss with Dr. Gunn   possible dc home today       Assessment:  Please Check When Present   [] Acute blood loss anemia  []Cerebral edema, [] Brain compression/ herniation,     []  GCS  E   V  M     Heart Failure: []Acute, [] acute on chronic , []chronic  Heart Failure:  [] Diastolic (HFpEF), [] Systolic (HFrEF), []Combined (HFpEF and HFrEF), [] RHF, [] Pulm HTN, [] Other    [] CHUCK, [] ATN, [] AIN, [] other  [] CKD1, [] CKD2, [] CKD 3, [] CKD 4, [] CKD 5, []ESRD    Encephalopathy: [] Metabolic, [] Hepatic, [] toxic, [] Neurological, [] Other    Abnormal Nurtitional Status: [] malnurtition (see nutrition note), [ ]underweight: BMI < 19, [] morbid obesity: BMI >40, [] Cachexia  [] Functional quadriplegia  [] Sepsis  [] hypovolemic shock,[] cardiogenic shock, [] hemorrhagic shock, [] neuogenic shock  [] Acute Respiratory Failure          Spectralink # [id number] HPI:  This is a 79 y/o female with history of obesity and HTN,  c/o back pain with weakness lower extremities with frequent falls, seen orthopedics for evaluation, recommended surgery. Pt on ASA 81 mg TIW prophylactic , c/o epistaxis  at times . 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 (10 May 2019 15:50)    PROCEDURE: 5/22/2019 left metrx l3 laminectomy        PAST MEDICAL & SURGICAL HISTORY:  Carotid stenosis: less than 40 % stenosis bilateral  Morbid obesity  MVP (mitral valve prolapse): echo 2015 , no SBE  Hypertension: controlled  H/O laparoscopy  S/P dilation and curettage  History of hernia surgery  S/P tonsillectomy and adenoidectomy        PLAN:  Neuro: neuro checks q 4, vitals q 4, continue pain control with tylenol and oxy IR, flat till noon than out of bed   Respiratory: incentive spirometry o2 >90  CV: keep sbp 100-150 history of hypertension continue vasotec  Endocrine: euyglycemia  Heme/Onc:      stable, leukocytosis secondary to steroids improving       DVT ppx: scds, start lovenox tonight   Renal: ivl voiding  ID: [email or web address]  GI:   tolerating diet   PT/OT: after noon, patient wants to go home today.    Will discuss with Dr. Gunn   possible dc home today       Assessment:  Please Check When Present   [] Acute blood loss anemia  []Cerebral edema, [] Brain compression/ herniation,     []  GCS  E   V  M     Heart Failure: []Acute, [] acute on chronic , []chronic  Heart Failure:  [] Diastolic (HFpEF), [] Systolic (HFrEF), []Combined (HFpEF and HFrEF), [] RHF, [] Pulm HTN, [] Other    [] CHUCK, [] ATN, [] AIN, [] other  [] CKD1, [] CKD2, [] CKD 3, [] CKD 4, [] CKD 5, []ESRD    Encephalopathy: [] Metabolic, [] Hepatic, [] toxic, [] Neurological, [] Other    Abnormal Nurtitional Status: [] malnurtition (see nutrition note), [ ]underweight: BMI < 19, [x] morbid obesity: BMI >40 recommend weight loss, [] Cachexia  [] Functional quadriplegia  [] Sepsis  [] hypovolemic shock,[] cardiogenic shock, [] hemorrhagic shock, [] neuogenic shock  [] Acute Respiratory Failure          Spectralink # [id number] HPI:  This is a 77 y/o female with history of obesity and HTN,  c/o back pain with weakness lower extremities with frequent falls, seen orthopedics for evaluation, recommended surgery. Pt on ASA 81 mg TIW prophylactic , c/o epistaxis  at times . 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 (10 May 2019 15:50)    PROCEDURE: 5/22/2019 left metrx l3 laminectomy        PAST MEDICAL & SURGICAL HISTORY:  Carotid stenosis: less than 40 % stenosis bilateral  Morbid obesity  MVP (mitral valve prolapse): echo 2015 , no SBE  Hypertension: controlled  H/O laparoscopy  S/P dilation and curettage  History of hernia surgery  S/P tonsillectomy and adenoidectomy        PLAN:  Neuro: neuro checks q 4, vitals q 4, continue pain control with tylenol and oxy IR, flat till noon than out of bed   Respiratory: incentive spirometry o2 >90  CV: keep sbp 100-150 history of hypertension continue vasotec  Endocrine: euyglycemia  Heme/Onc:      stable, leukocytosis secondary to steroids improving       DVT ppx: scds, start lovenox tonight   Renal: ivl voiding, using bed pan as was flat  ID: [email or web address]  GI:   tolerating diet   PT/OT: after noon, patient wants to go home today.    Will discuss with Dr. Gunn   possible dc home today       Assessment:  Please Check When Present   [] Acute blood loss anemia  []Cerebral edema, [] Brain compression/ herniation,     []  GCS  E   V  M     Heart Failure: []Acute, [] acute on chronic , []chronic  Heart Failure:  [] Diastolic (HFpEF), [] Systolic (HFrEF), []Combined (HFpEF and HFrEF), [] RHF, [] Pulm HTN, [] Other    [] CHUCK, [] ATN, [] AIN, [] other  [] CKD1, [] CKD2, [] CKD 3, [] CKD 4, [] CKD 5, []ESRD    Encephalopathy: [] Metabolic, [] Hepatic, [] toxic, [] Neurological, [] Other    Abnormal Nurtitional Status: [] malnurtition (see nutrition note), [ ]underweight: BMI < 19, [x] morbid obesity: BMI >40 recommend weight loss, [] Cachexia  [] Functional quadriplegia  [] Sepsis  [] hypovolemic shock,[] cardiogenic shock, [] hemorrhagic shock, [] neuogenic shock  [] Acute Respiratory Failure          Spectralink # [id number]

## 2019-05-23 NOTE — CHART NOTE - NSCHARTNOTEFT_GEN_A_CORE
CAPRINI SCORE [CLOT] Score on Admission for     AGE RELATED RISK FACTORS                                                       MOBILITY RELATED FACTORS  [ ] Age 41-60 years                                            (1 Point)                  [ ] Bed rest                                                        (1 Point)  [ ] Age: 61-74 years                                           (2 Points)                 [ ] Plaster cast                                                   (2 Points)  [x ] Age= 75 years                                              (3 Points)                 [ ] Bed bound for more than 72 hours                 (2 Points)    DISEASE RELATED RISK FACTORS                                               GENDER SPECIFIC FACTORS  [ ] Edema in the lower extremities                       (1 Point)                  [ ] Pregnancy                                                     (1 Point)  [ ] Varicose veins                                               (1 Point)                  [ ] Post-partum < 6 weeks                                   (1 Point)             [x ] BMI > 25 Kg/m2                                            (1 Point)                  [ ] Hormonal therapy  or oral contraception          (1 Point)                 [ ] Sepsis (in the previous month)                        (1 Point)                  [ ] History of pregnancy complications                 (1 point)  [ ] Pneumonia or serious lung disease                                               [ ] Unexplained or recurrent                     (1 Point)           (in the previous month)                               (1 Point)  [ ] Abnormal pulmonary function test                     (1 Point)                 SURGERY RELATED RISK FACTORS (include planned surgeries)  [ ] Acute myocardial infarction                              (1 Point)                 [ ]  Section                                             (1 Point)  [ ] Congestive heart failure (in the previous month)  (1 Point)         [ ] Minor surgery                                                  (1 Point)   [ ] Inflammatory bowel disease                             (1 Point)                 [ ] Arthroscopic surgery                                        (2 Points)  [ ] Central venous access                                      (2 Points)                [x ] General surgery lasting more than 45 minutes   (2 Points)       [ ] Stroke (in the previous month)                          (5 Points)               [ ] Elective arthroplasty                                         (5 Points)            [ ] current or past malignancy                              (2 Points)                                                                                                       HEMATOLOGY RELATED FACTORS                                                 TRAUMA RELATED RISK FACTORS  [ ] Prior episodes of VTE                                     (3 Points)                [ ] Fracture of the hip, pelvis, or leg                       (5 Points)  [ ] Positive family history for VTE                         (3 Points)                 [ ] Acute spinal cord injury (in the previous month)  (5 Points)  [ ] Prothrombin 70162 A                                     (3 Points)                 [ ] Paralysis  (less than 1 month)                             (5 Points)  [ ] Factor V Leiden                                             (3 Points)                  [ ] Multiple Trauma within 1 month                        (5 Points)  [ ] Lupus anticoagulants                                     (3 Points)                                                           [ ] Anticardiolipin antibodies                               (3 Points)                                                       [ ] High homocysteine in the blood                      (3 Points)                                             [ ] Other congenital or acquired thrombophilia      (3 Points)                                                [ ] Heparin induced thrombocytopenia                  (3 Points)                                          Total Score [      6    ]    Risk:  Very low 0   Low 1 to 2   Moderate 3 to 4   High =5       VTE Prophylasix Recommednations:  [ x] mechanical pneumatic compression devices                                      [ ] contraindicated: _____________________  [ x] chemo prophylasix                                                                                   [ ] contraindicated _____________________    **** HIGH LIKELIHOOD DVT PRESENT ON ADMISSION  [ ] (please order LE dopplers within 24 hours of admission)

## 2019-07-15 ENCOUNTER — RECORD ABSTRACTING (OUTPATIENT)
Age: 79
End: 2019-07-15

## 2019-09-24 ENCOUNTER — APPOINTMENT (OUTPATIENT)
Dept: NEUROLOGY | Facility: CLINIC | Age: 79
End: 2019-09-24
Payer: MEDICARE

## 2019-09-24 PROCEDURE — 93880 EXTRACRANIAL BILAT STUDY: CPT

## 2019-09-24 PROCEDURE — 93886 INTRACRANIAL COMPLETE STUDY: CPT

## 2019-09-24 PROCEDURE — 93892 TCD EMBOLI DETECT W/O INJ: CPT

## 2019-09-26 ENCOUNTER — APPOINTMENT (OUTPATIENT)
Dept: NEUROLOGY | Facility: CLINIC | Age: 79
End: 2019-09-26
Payer: MEDICARE

## 2019-09-26 VITALS
HEART RATE: 93 BPM | WEIGHT: 240 LBS | SYSTOLIC BLOOD PRESSURE: 175 MMHG | DIASTOLIC BLOOD PRESSURE: 94 MMHG | BODY MASS INDEX: 37.67 KG/M2 | HEIGHT: 67 IN

## 2019-09-26 VITALS
HEIGHT: 67 IN | WEIGHT: 240 LBS | SYSTOLIC BLOOD PRESSURE: 175 MMHG | DIASTOLIC BLOOD PRESSURE: 95 MMHG | HEART RATE: 93 BPM | BODY MASS INDEX: 37.67 KG/M2

## 2019-09-26 DIAGNOSIS — G47.30 SLEEP APNEA, UNSPECIFIED: ICD-10-CM

## 2019-09-26 PROBLEM — I34.1 NONRHEUMATIC MITRAL (VALVE) PROLAPSE: Chronic | Status: ACTIVE | Noted: 2019-05-10

## 2019-09-26 PROBLEM — E66.01 MORBID (SEVERE) OBESITY DUE TO EXCESS CALORIES: Chronic | Status: ACTIVE | Noted: 2019-05-10

## 2019-09-26 PROBLEM — I10 ESSENTIAL (PRIMARY) HYPERTENSION: Chronic | Status: ACTIVE | Noted: 2019-05-10

## 2019-09-26 PROBLEM — I65.29 OCCLUSION AND STENOSIS OF UNSPECIFIED CAROTID ARTERY: Chronic | Status: ACTIVE | Noted: 2019-05-10

## 2019-09-26 PROCEDURE — 99215 OFFICE O/P EST HI 40 MIN: CPT
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
## 2019-09-26 NOTE — REVIEW OF SYSTEMS
[Dysuria] : no dysuria [FreeTextEntry2] :   probable snoring; some daytime somnolence [FreeTextEntry7] : incontinence [FreeTextEntry8] : frequency [FreeTextEntry9] : joint weakness-unstable left knee

## 2019-09-26 NOTE — PHYSICAL EXAM
[FreeTextEntry1] : She looked generally well. Mental status was intact. She was alert, attentive and fully oriented. Speech was fluent and comprehension intact. Memory and fund of knowledge were normal. On cranial nerve exam, I had difficulty seeing the left fundus but the right fundus was within normal limits. The remainder of cranial nerves II through XII was intact. On motor exam tone was normal. All further abnormalities were confined to the left side: arm drift; moderately slow fine finger movements; deltoid 4/5; iliopsoas 3/5 with some discomfort and probably decreased effort; anterior tibialis with mildly decreased range of motion but 5-/5; otherwise, power was within normal limits throughout. Reflexes were 1+ in the right arm, 1+-2+ in the left arm, trace at the right knee, 1+ at the left knee, absent at the right ankle and trace at the left ankle. There were bilateral Babinski signs. Coordination in the arms was intact and was not tested in the legs due to discomfort. She walked well with her walker. Tandem gait and Romberg were not tested. Sensory exam was intact to all modalities.
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
## 2020-03-26 ENCOUNTER — APPOINTMENT (OUTPATIENT)
Dept: NEUROLOGY | Facility: CLINIC | Age: 80
End: 2020-03-26

## 2020-04-02 ENCOUNTER — APPOINTMENT (OUTPATIENT)
Dept: NEUROLOGY | Facility: CLINIC | Age: 80
End: 2020-04-02

## 2020-05-04 ENCOUNTER — TRANSCRIPTION ENCOUNTER (OUTPATIENT)
Age: 80
End: 2020-05-04

## 2020-08-18 NOTE — H&P PST ADULT - BP NONINVASIVE SYSTOLIC (MM HG)
Name:  Geeta Renee  :  1934  Date:  2020     REFERRING PHYSICIAN  Vishal Mckenzie MD, PhD    PRIMARY CARE PROVIDER  Tracy Harris APRN    REASON FOR FOLLOW UP  1. Esophageal adenocarcinoma (CMS/HCC)    2. Malignant neoplasm of upper-outer quadrant of left breast in female, estrogen receptor positive (CMS/HCC)      CHIEF COMPLAINT  None.    Dear Dr. Mckenzie,    HISTORY OF PRESENT ILLNESS:   I saw Ms. Renee in follow up today in our medical oncology clinic. As you are aware, she is a pleasant, 85 y.o., white female with a history of hypertension, arthritis and Addison's esophagus who has been undergoing periodic repeat endoscopic evaluations for the latter since she was first diagnosed in ~. She was again found to have high grade dysplasia in late summer 2017 and was referred back to the [facility] (where she had undergone prior radiofrequency ablations). A repeat local evaluation at  in 2017, unfortunately, was concerning for a component of carcinoma invasive into the submucosa. Imaging at the time (including a PET scan) was consistent with early stage disease, with no definite evidence of either mediastinal node involvement or distant mets. Ultimately, following an evaluation by CT surgery (neither they nor the patient were interested in pursuing an esophagectomy), she was referred to you, closer to her home in [place], [place], to consider treatment with localized radiotherapy. The patient was agreeable to this and was subsequently referred to our clinic to consider providing concomitant chemotherapy with the XRT. She completed planned treatment with concomitant Xeloda/XRT on 2018; and she has been doing overall very well ever since then, with no symptoms/signs of residual/recurernt disease to date.    INTERIM HISTORY:  Ms. Renee presents to clinic today for follow up by herself. Since her last visit, she has continued to do overall well. She most  recently underwent another routine, surveillance EGD in 2019 and this was, again, reportedly unremarkable (this procedure was planned to be repeated this past Spring; however, due to the COVID-19 crisis, it is now tentatively scheduled for September). She again denies having dysphagia at present. Her appetite remains good. She was diagnosed with an early stage, left-sided, ER/W[place] strongly positive, well-differentiated breast cancer in 2019. With her age and comorbidities, her surgeon ultimately recommended treatment with lumpectomy followed by adjuvant Arimidex therapy alone (which she has been on for a total of about nine months now and continues to tolerate well). She tolerated February's initial cycle of [id number] Prolia well, with no noticeable side effects). She again has no new or specific complaints.    Past Medical History:   Diagnosis Date   • Arthritis    • Breast cancer (CMS/HCC)    • Breast lump     left   • Esophageal cancer (CMS/HCC)    • GERD (gastroesophageal reflux disease)    • Hypertension    • Snoring        Past Surgical History:   Procedure Laterality Date   • BREAST LUMPECTOMY Left 2019    Procedure: BREAST LUMPECTOMY WITH ULTRASOUND;  Surgeon: Nurys Selby MD;  Location: [facility];  Service: General   • CATARACT EXTRACTION, BILATERAL  09/10/2019   • CHOLECYSTECTOMY     • COLONOSCOPY     • ELBOW PROCEDURE Right    • ENDOSCOPY     • FRACTURE SURGERY      right elbow       Social History     Socioeconomic History   • Marital status:      Spouse name: Not on file   • Number of children: Not on file   • Years of education: Not on file   • Highest education level: Not on file   Tobacco Use   • Smoking status: Former Smoker     Packs/day: 0.50     Years: 30.00     Pack years: 15.00     Types: Cigarettes     Last attempt to quit:      Years since quittin.6   • Smokeless tobacco: Never Used   Substance and Sexual Activity   • Alcohol use: No   • Drug use: No    • Sexual activity: Defer       Family History   Problem Relation Age of Onset   • Hypertension Mother    • Heart disease Brother    • Breast cancer Neg Hx        Allergies   Allergen Reactions   • Codeine Nausea Only   • Sulfa Antibiotics Hives       Current Outpatient Medications   Medication Sig Dispense Refill   • amLODIPine (NORVASC) 5 MG tablet Take 10 mg by mouth.     • anastrozole (ARIMIDEX) 1 MG tablet Take  by mouth Daily.     • atenolol (TENORMIN) 100 MG tablet Take 100 mg by mouth Daily.     • dexlansoprazole (DEXILANT) 60 MG capsule Take 60 mg by mouth Daily.     • HYDROcodone-acetaminophen (NORCO) 5-325 MG per tablet Take 1 tablet by mouth Every 6 (Six) Hours As Needed for Moderate Pain . 10 tablet 0   • triamterene-hydrochlorothiazide (MAXZIDE-25) 37.5-25 MG per tablet Take 1 tablet by mouth Daily.       No current facility-administered medications for this visit.      REVIEW OF SYSTEMS  CONSTITUTIONAL:  No fever, chills or night sweats.   EYES:  No blurry vision, diplopia or other vision changes.  ENT:  No hearing loss, nosebleeds or sore throat.  CARDIOVASCULAR:  No palpitations, arrhythmia, syncopal episodes or edema.  PULMONARY:  No hemoptysis, wheezing, chronic cough or shortness of breath.  GASTROINTESTINAL:  No nausea or vomiting. No constipation or diarrhea. No dysphagia.  GENITOURINARY:  No hematuria, kidney stones or frequent urination.  MUSCULOSKELETAL: Chronic, mild joint and back pains, stable.  INTEGUMENTARY: No rashes or pruritus.  ENDOCRINE:  No excessive thirst or hot flashes.  HEMATOLOGIC:  No history of free bleeding, spontaneous bleeding or clotting.  IMMUNOLOGIC:  No allergies or frequent infections.  NEUROLOGIC: No numbness, tingling, seizures or weakness.  PSYCHIATRIC:  No anxiety or depression.    PHYSICAL EXAMINATION  /73   Pulse 64   Temp 97.3 °F (36.3 °C) (Temporal)   Resp 18   Wt 90.9 kg (200 lb 6.4 oz)   SpO2 98%   BMI 34.40 kg/m²     GENERAL:  A  well-developed, well-nourished, elderly, white female in no acute distress  HEENT:  Pupils equally round and reactive to light. Extraocular muscles intact.  CARDIOVASCULAR:  Regular rate and rhythm.  No murmurs, gallops or rubs.  LUNGS:  Clear to auscultation bilaterally.  ABDOMEN:  Soft, nontender, nondistended with positive bowel sounds.  EXTREMITIES:  No clubbing, cyanosis or edema bilaterally.  SKIN:  No rashes or petechiae.  NEURO:  Cranial nerves grossly intact.  No focal deficits.  PSYCH:  Alert and oriented x3.    The physical exam is unchanged from the previous one.    LABORATORY  Lab Results   Component Value Date    WBC 6.23 08/18/2020    HGB 12.3 08/18/2020    HCT 37.4 08/18/2020    MCV 87.6 08/18/2020     08/18/2020    NEUTROABS 4.44 08/18/2020       Lab Results   Component Value Date     02/18/2020    K 3.8 02/18/2020    CL 97 (L) 02/18/2020    CO2 29.5 (H) 02/18/2020    BUN 18 02/18/2020    CREATININE 1.12 (H) 02/18/2020    GLUCOSE 107 (H) 02/18/2020    CALCIUM 10.0 02/18/2020    AST 19 02/18/2020    ALT 10 02/18/2020    ALKPHOS 108 02/18/2020    BILITOT 0.4 02/18/2020    PROTEINTOT 7.3 02/18/2020    ALBUMIN 3.76 02/18/2020     IMAGING  CT chest, abdomen and pelvis with contrast (11/09/2017):  Impression:  1) Mild, nonspecific thickening of the distal esophagus which may be related to diagnosis of esophageal cancer. Multiple subcentimeter right paraesophageal lymph nodes are noted.  2) 1.3 mm ground glass nodule in the left upper lobe. No suspicious solid nodules that would suggest metastatic disease.  3) No evidence of metastatic disease to the abdomen or pelvis.  4) Bilobed fusiform AAA measuring up to 4.6 cm beneath the renal arteries.  5) Cardiomegaly with artery atherosclerosis.    NM PET with fusion CT, skull base to mid-thigh (12/07/2017, at ):  Impression:  1) No suspicious focal FDG uptake within the esophagus to suggest a site of invasive malignancy.  2) No suspicious  metabolically active mediastinal adenopathy.  3) No suspicious hypermetabolic distal metastasis.  4) Few non-FDG avid groundglass nodules, the largest in the left upper lobe of the lung, unchanged since the most recent comparison CT of the chest of one month prior.    CT chest with contrast (07/02/2018):  Impression: Thickening of the mucosa in the distal portion of the esophagus consistent with clinical history of adenocarcinoma of the esophagus. There is nothing seen on the CT to suggest metastatic disease in the lung parenchyma or in the mediastinal lymph nodes.    CT abdomen and pelvis with contrast (07/02/2018):  Impression: No CT findings of metastatic disease in the abdomen or pelvis. There were benign cysts in the kidneys. There is aneurysmal dilatation of the infrarenal abdominal aorta with a maximum diameter of 4.7 cm.    CT chest with contrast (10/08/2018):  Impression: Thickening of the esophageal wall, similar to the previous exam. No new pulmonary pathology.    CT abdomen and pelvis with contrast (10/08/2018):  Impression:  1) Thickening of the distal esophageal wall, similar to the previous exam.  2) Very slight interval increase in size of AAA (from 4.7 to 4.8 cm).    CT chest with contrast (01/07/2019):  Impression:  1) Esophageal wall thickening of the mid-distal esophagus with no increase since the previous exam. No mediastinal masses or adenopathy identified.  2) Remainder of chest is stable from previous.    CT abdomen and pelvis with contrast (01/07/2019):  Impression:  1) Stable exam demonstrating no evidence of metastatic disease to the abdomen or pelvis. Distal esophageal wall thickening and hiatal hernia similar to previous.  2) Based on coronal reformatted sequences, likely no significant change in transverse diameter of infrarenal abdominal aortic aneurysm (4.7 cm and was previously 4.7 cm).    CT chest with contrast (04/04/2019)  Impression: Stable CT scan of the thorax. There continues  to be some  mild mucosal thickening in the distal portion of the esophagus, stable  in comparing with the earlier exam.     CT abdomen pelvis with contrast (04/04/2019)  Impression: Nothing seen to suggest metastatic disease in the abdomen  and pelvis. There is aneurysmal dilatation of the infrarenal portion of  the abdominal aorta. The diameter is increased slightly. Currently the  aortic diameter measures 4.9 cm and previously measures 4.7.    Bone densitometry (DEXA) scan (12/16/2019):  Impression: Osteoporosis with a T-score of -3.1 in the left hip.    CT chest with contrast (01/02/2020):  Impression: Stable CT of the thorax. Mild thickening of the mucosa remains in the esophagus. No enlarged lymph nodes have developed. There is a stable area of ground-glass parenchymal infiltrate in the left upper lobe.    CT abdomen and pelvis with contrast (01/02/2020):  Impression:  1) The biliary tree is not as dilated as it was on the previous CT done 10/03/2019.  2) The pancreas shows atrophy with fatty infiltrate. There are benign cysts in the kidneys. There is an infrarenal abdominal aortic aneurysm which now has a diameter of 5.3 cm. There is a hiatal hernia in the lower mediastinum.    Bilateral diagnostic mammogram with tomosynthesis (05/12/2020):  Impression:  1) Stable mammographic appearance of the right breast with no findings suspicious for malignancy.  2) Presumed postoperative and post-treament related changes in the left breast.  Bi-Rads Category 3: Probably Benign.    PROCEDURES  Esophagogastroduodenoscopy with esophageal biopsies and dilatation (06/26/2018):  Impression:  1) Radiation esophagitis with ulceration, dense exudate and tight stenosis balloon dilated to 12 mm and biopsies were performed.  2) Appearance of residual Addison's esophagus with papillary mucosal appearance in the distal most esophagus.  3) Large hiatal hernia.  4) Gastric food retention consistent with  gastroparesis.    PATHOLOGY  Esophagus, nodules (10/19/2017):  Adenocarcinoma, invasive at least into the muscularis mucosa.    Esophageal biopsy, stricture (06/26/2018):  Gastric type mucosa with ulceration, acute and chronic inflammation, and focal intestinal metaplasia, negative for dysplasia.    Esophagus, biopsy, distal (01/18/2019):  Addison's esophagitis without dysplasia. Ulceration with acute inflammation and reactive epithelial changes. Negative for malignancy.    Left breast, mass, needle core biopsies (10/09/2019):  Invasive, well-differentiated ductal adenocarcinoma. Palm-Velasquez grade 1 (3+1+1). ER strongly positive (100%), VA strongly positive (100%), HER2/lisa negative (1+ by IHC).    Breast margin, left, deep margin (11/19/2019):  Invasive, well-differentiated ductal carcinoma with lobular features, involving previously inked deep resection margin.    Breast, lumpectomy, left (11/19/2019):  Invasive, well-differentiated ductal carcinoma (1.9 cm) with associated low-to-intermediate grade DCIS, solid and cribiform types. Margins are free of tumor. Pathologic tumor stage [id number].    IMPRESSION AND PLAN  Ms. Renee is a 85 y.o., white female with:  1. Esophageal adenocarcinoma: Diagnosed with clinical stage [id number] disease in late 2017 (although she did not undergo an endoscopic ultrasound). Regardless, even though she was (and is) in overall good health, she was not eligible for an esophagectomy due to her advanced age and comorbidities (and she has repeatedly stated that she would not have wanted one anyway). Therefore, we agreed with CT surgery and radiation oncology's initial recommendations for localized radiotherapy. It was also agreed that she would be a good candidate for adding concomitant chemotherapy to this treatment plan, and PO capecitabine (Xeloda) was a convenient and fairly well tolerable choice. A Rx for 1500 mg BID M-F throughout the planned course of localized XRT was  provided. She began this treatment regimen in early March 2018 and completed it on 04/26/2018. Overall, she tolerated this treatment regimen very well, with fatigue and mild dysphagia (both of which are now improved) as her only noticeable side effects. Clinically she continues to feel overall well. Repeat CT scans performed on 07/02/2018 (and summarized above) were consistent with treatment related changes only, with no definite evidence of residual/recurrent disease; and this continues to be the case on the most recent follow up imaging (CT scans performed on 01/02/2020 and also summarized above). These were scheduled to be repeated prior to today's appointment, but she missed them due to the ongoing COVID-19 crisis. We will reschedule them within the next few weeks. The most recent repeat, surveillance EGD (performed in August 2019) again reportedly showed no evidence of residual/recurrent disease (another repeat endoscopy is reportedly now planned for next month). We will see her back in our clinic in six months.  2. Dysphagia: The patient had no such issues prior to starting Xeloda/XRT; however, toward the end of this regimen, she began to experience some increasing difficulty with swallowing, particularly with her pills. This issue remained manageable throughout the remainder of her treatment and has resolved since the completion of it, particularly after she underwent an esophageal dilatation in late June 2018. She will continue to follow up with gastroenterology as planned (a repeat EGD is reportedly planned next month). Continue to monitor.  3. AAA: A known issue. The most recent repeat CT scan of the abdomen/pelvis (performed on 01/02/2020 and summarized above) showed a substantial increase in diameter (from 4.9 cm to 5.3 cm). Although the risks would obviously be high, she is nearing the typical threshold (5.5 cm or greater) where intervention is usually considered. She has been following with thoracic  surgery in [place], [place] for routine monitoring. We will repeat CT scans within the next few weeks.  4. Breast carcinoma: She was diagnosed with an early stage, ER/CT strongly positive, well-differentiated ductal adenocarcinoma of the left breast in fall 2019. With her age and comorbidities, her surgeon ultimately recommended treatment with lumpectomy (performed on 11/19/2019) followed by adjuvant endocrine (with an AI) therapy alone (and the patient agreed). She has been on Arimidex for a total of about nine (9) months now and is still tolerating it overall well, with no significant side effects. The current standard of care is to remain on this medication for at least a total of five years (through ~late 2024).  5. Osteoporosis: A known, longstanding issue for which she had been on Fosamax. She stopped this medication when she was diagnosed with issue #1, as it seemed to exacerbate the reflux and discomfort that the cancer was causing. Her most recent bone densitometry (DEXA) scan performed on 12/16/2019 and summarized above showed that her BMD remains solidly within the osteoporotic range. Particularly now that she is on adjuvant Arimidex for issue #4 (see above), restarting treatment for this issue (the osteoporosis) was strongly recommended; and she began [id number] Prolia in February 2020. She tolerated the first cycle well, with no noticeable side effects. We will give the second cycle in clinic today.  The patient was in agreement with these plans.    It is a pleasure to participate in Ms. Renee's care. Please do not hesitate to call with any questions or concerns that you may have.    A total of 30 minutes were spent coordinating this patient’s care in clinic today; more than 50% of this time was spent face-to-face with the patient, reviewing her interim medical history and counseling on the current evaluation, treatment and followup plan. All questions were answered to her satisfaction.    FOLLOW  UP  Continue adjuvant Arimidex. With gastroenterology for a repeat EGD in ~September 2020, as previously planned. With vascular/thoracic surgery in [place], [place], as previously planned AAA. With breast surgery, as previously planned. Repeat CTs of the chest, abdomen and pelvis within the next few weeks. Proceed with [id number] Proila (denosumab), as planned (2nd cycle today). Return to our clinic in 6 months (~mid-February 2021), on the day of the 3rd cycle of [id number] Prolia.            This document was electronically signed by BERTHA Berrios MD August 18, 2020       CC: Vishal Mckenzie MD, PhD   MD Guerrero Arce MD James H. Shoptaw, MD Janey P. Phipps, APRN   [phone]

## 2021-03-13 ENCOUNTER — NON-APPOINTMENT (OUTPATIENT)
Age: 81
End: 2021-03-13

## 2021-03-22 ENCOUNTER — APPOINTMENT (OUTPATIENT)
Dept: NEUROLOGY | Facility: CLINIC | Age: 81
End: 2021-03-22
Payer: MEDICARE

## 2021-03-22 VITALS
HEIGHT: 67 IN | BODY MASS INDEX: 38.92 KG/M2 | SYSTOLIC BLOOD PRESSURE: 150 MMHG | HEART RATE: 80 BPM | DIASTOLIC BLOOD PRESSURE: 92 MMHG | WEIGHT: 248 LBS

## 2021-03-22 VITALS
HEIGHT: 67 IN | TEMPERATURE: 97.4 F | HEART RATE: 60 BPM | DIASTOLIC BLOOD PRESSURE: 90 MMHG | SYSTOLIC BLOOD PRESSURE: 150 MMHG | BODY MASS INDEX: 38.92 KG/M2 | WEIGHT: 248 LBS

## 2021-03-22 VITALS — TEMPERATURE: 97.4 F

## 2021-03-22 PROCEDURE — 99215 OFFICE O/P EST HI 40 MIN: CPT

## 2021-03-22 PROCEDURE — 93886 INTRACRANIAL COMPLETE STUDY: CPT

## 2021-03-22 PROCEDURE — 93880 EXTRACRANIAL BILAT STUDY: CPT

## 2021-03-22 PROCEDURE — 93892 TCD EMBOLI DETECT W/O INJ: CPT

## 2021-03-22 RX ORDER — ERGOCALCIFEROL 1.25 MG/1
1.25 MG CAPSULE, LIQUID FILLED ORAL
Qty: 4 | Refills: 0 | Status: ACTIVE | COMMUNITY
Start: 2021-02-09
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
## 2021-03-22 NOTE — PHYSICAL EXAM
[FreeTextEntry1] : She looked generally well. Mental status was intact. She was alert, attentive and fully oriented. Speech was fluent and comprehension intact. Memory and fund of knowledge were normal. On cranial nerve exam, I had difficulty seeing the left fundus but the right fundus was within normal limits. The remainder of cranial nerves II through XII was intact. On motor exam tone was normal. All further abnormalities were confined to the left side: arm drift; moderately slow fine finger movements; deltoid 4/5; iliopsoas 3/5 with some discomfort and probably decreased effort; anterior tibialis with mildly decreased range of motion but 5-/5; otherwise, power was within normal limits throughout. Reflexes were 1+ in the right arm, 1+-2+ in the left arm, trace at the right knee, 1+ at the left knee, absent at the right ankle and trace at the left ankle. There were bilateral Babinski signs. Coordination in the arms was intact and was not tested in the legs due to discomfort. She walked well with her walker. Tandem gait and Romberg were not tested. Sensory exam was intact to all modalities. normal rate, regular rhythm, and no murmur.

## 2021-03-22 NOTE — REVIEW OF SYSTEMS
[Dysuria] : no dysuria [FreeTextEntry2] :  daytime somnolence [FreeTextEntry7] : incontinence [FreeTextEntry8] : frequency [FreeTextEntry9] : joint weakness-unstable left knee

## 2021-04-26 ENCOUNTER — NON-APPOINTMENT (OUTPATIENT)
Age: 81
End: 2021-04-26

## 2021-07-06 NOTE — PROGRESS NOTE ADULT - SUBJECTIVE AND OBJECTIVE BOX
Spoke with pt that she will be having a procedure in August with Dr. Sen . Pt is not sure if its endoscopy . She also wants to see her doctor because she fell down in a grocery store and went to ER. Offered an apt to see her PCP this week. Pt wants to schedule for next week.  She was already given an apt on 7/12.  Called Dr. Sen 's office left a message to call me.  
Vital Signs Last 24 Hrs  T(C): 36.6 (22 May 2019 12:42), Max: 36.6 (22 May 2019 10:10)  T(F): 97.9 (22 May 2019 10:10), Max: 97.9 (22 May 2019 10:10)  HR: 90 (22 May 2019 12:42) (90 - 90)  BP: 147/85 (22 May 2019 12:42) (147/85 - 147/85)  BP(mean): --  RR: 18 (22 May 2019 12:42) (18 - 18)  SpO2: 95% (22 May 2019 12:42) (95% - 95%)    EXAM  PHYSICAL EXAM:    Constitutional: No Acute Distress     Neurological: AOx3, Following Commands    Motor exam:  Pain limited          Upper extremity                         Delt     Bicep     Tricep    HG                                                 R         5/5        5/5        5/5       5/5                                               L          5/5        5/5        5/5       5/5          Lower extremity                        HF         KF        KE       DF         PF                                                  R        3/5        4/5        4/5       4/5        4/5                                               L         3/5        4/5      4/5       4/5          4/5                                                 Sensation: [x] intact to light touch  [] decreased:
SUBJECTIVE: Patient seen and examined.  She is very happy with her surgery.  She can now bend her left knee which she could not for months     Vital Signs Last 24 Hrs  T(C): 36.7 (23 May 2019 09:54), Max: 37 (23 May 2019 04:48)  T(F): 98.1 (23 May 2019 09:54), Max: 98.6 (23 May 2019 04:48)  HR: 90 (23 May 2019 09:54) (87 - 110)  BP: 122/68 (23 May 2019 09:54) (122/68 - 166/98)  BP(mean): 100 (22 May 2019 21:00) (97 - 121)  RR: 18 (23 May 2019 09:54) (14 - 18)  SpO2: 95% (23 May 2019 09:54) (91% - 98%)    PHYSICAL EXAM:    Constitutional: No Acute Distress     Neurological: AOx3, Following Commands, Moving all Extremities     Motor exam:          Upper extremity                         Delt     Bicep     Tricep    HG                                                 R         5/5        5/5        5/5       5/5                                               L          5/5        5/5        5/5       5/5          Lower extremity                        HF         KF        KE       DF         PF                                                  R        5/5        5/5        5/5       5/5         5/5                                               L         5/5        4-/5       4-/5       5/5          5/5                                                 Sensation: [x] intact to light touch  [] decreased:     Pulmonary: Clear to Auscultation, No rales, No rhonchi, No wheezes     Cardiovascular: S1, S2, Regular rate and rhythm     Gastrointestinal: Soft, Non-tender, Non-distended     Extremities: No calf tenderness     Incision:   LABS:                        12.1   13.08 )-----------( 236      ( 23 May 2019 08:43 )             37.4    05-23    139  |  101  |  15  ----------------------------<  201<H>  4.3   |  26  |  0.68    Ca    8.9      23 May 2019 07:02        05-22 @ 07:01  -  05-23 @ 07:00  --------------------------------------------------------  IN: 600 mL / OUT: 2300 mL / NET: -1700 mL      DRAINS:     MEDICATIONS:  Anticoagulation:     Antibiotics:    Endo:  dexamethasone  Injectable 4 milliGRAM(s) IV Push every 6 hours    Neuro:  acetaminophen   Tablet .. 650 milliGRAM(s) Oral every 6 hours PRN Temp greater or equal to 38C (100.4F), Mild Pain (1 - 3)  oxyCODONE    IR 5 milliGRAM(s) Oral every 4 hours PRN Moderate Pain (4 - 6)  oxyCODONE    IR 10 milliGRAM(s) Oral every 4 hours PRN Severe Pain (7 - 10)    Cardiac:  enalapril 20 milliGRAM(s) Oral two times a day    Pulm:    GI/:  docusate sodium 100 milliGRAM(s) Oral three times a day  senna 2 Tablet(s) Oral at bedtime    Other:     DIET: Regular

## 2021-09-22 ENCOUNTER — APPOINTMENT (OUTPATIENT)
Dept: OTOLARYNGOLOGY | Facility: CLINIC | Age: 81
End: 2021-09-22
Payer: MEDICARE

## 2021-09-22 VITALS — HEIGHT: 67 IN | WEIGHT: 243 LBS | BODY MASS INDEX: 38.14 KG/M2

## 2021-09-22 DIAGNOSIS — R09.82 POSTNASAL DRIP: ICD-10-CM

## 2021-09-22 PROCEDURE — 99203 OFFICE O/P NEW LOW 30 MIN: CPT | Mod: 25

## 2021-09-22 PROCEDURE — 30901 CONTROL OF NOSEBLEED: CPT | Mod: RT

## 2021-09-22 PROCEDURE — 31231 NASAL ENDOSCOPY DX: CPT

## 2021-09-22 RX ORDER — HYDROCODONE BITARTRATE AND ACETAMINOPHEN 5; 325 MG/1; MG/1
5-325 TABLET ORAL
Qty: 10 | Refills: 0 | Status: DISCONTINUED | COMMUNITY
Start: 2021-05-19

## 2021-09-22 NOTE — HISTORY OF PRESENT ILLNESS
[de-identified] : Judith Peoples is a 79 yo female with hx of HTN who presents with right-sided epistaxis that started on Monday, 9/20. She packed it with gelfoam covered in gauze. She removed it yesterday, but today it occurred again. She has packed it again. She denies headaches or lightheadedness. She denies nasal congestion or drainage. She denies trauma or upper respiratory infection. She denies any fevers or chills.\par \par She states that she had one left-sided episode of epistaxis 20 years ago requiring cauterization.

## 2021-09-22 NOTE — ASSESSMENT
[FreeTextEntry1] : Judith Peoples presents with significant right sided epistaxis requiring placement of Nasopore. She was instructed on sinus precautions and use of nasal saline sprays. Precautions and treatment measures below were given:\par \par Prevention of epistaxis:\par 1. The goal is to maintain good hydration of the lining of the nose. Dryness inside the nose is a\par major cause of bleeding.\par 2. Use 2-3 sprays of nasal saline to both sides of nose several times daily.\par 3. Apply small amount of ointment to lining of the front of nose daily.\par 4. Avoid the use of ceiling fans or blowing air which can dry out your nose.\par 5. Consider use of a humidifier in the bedroom.\par 6. Avoid vigorous nose blowing. Cough and sneeze with your mouth open.\par 7. Your doctor may recommend stopping medicated nasal sprays for allergy or sinusitis, as\par these can sometimes worsen nosebleeds.\par 8. Maintain good control of your blood pressure.\par 9. Hold use of aspirin at this time.\par \par What to do if you have a nosebleed:\par 1. Use 2-3 sprays of topical nasal decongestant (ex: oxymetazoline, Afrin) on each side of the\par nose.\par 2. Hold firm pressure to soft part of nose for at least 5 minutes. Repeat as needed.\par 3. If bleeding is severe or does not resolve with these directions, seek immediate medical care.\par \par - Start clindamycin for 7 days while packing is in.\par - Follow up in 5 days.\par

## 2021-09-22 NOTE — PHYSICAL EXAM
[Nasal Endoscopy Performed] : nasal endoscopy was performed, see procedure section for findings [] : septum deviated bilaterally [Bleeding] : bleeding is present [de-identified] : Right caudal septum with prominent vessel [Midline] : trachea located in midline position [Normal] : no rashes

## 2021-09-22 NOTE — REVIEW OF SYSTEMS
[Post Nasal Drip] : post nasal drip [Nasal Congestion] : nasal congestion [Nose Bleeds] : nose bleeds [Throat Clearing] : throat clearing [Negative] : Heme/Lymph

## 2021-09-28 ENCOUNTER — APPOINTMENT (OUTPATIENT)
Dept: OTOLARYNGOLOGY | Facility: CLINIC | Age: 81
End: 2021-09-28
Payer: MEDICARE

## 2021-09-28 VITALS
HEIGHT: 67 IN | WEIGHT: 243 LBS | BODY MASS INDEX: 38.14 KG/M2 | SYSTOLIC BLOOD PRESSURE: 183 MMHG | DIASTOLIC BLOOD PRESSURE: 104 MMHG | HEART RATE: 76 BPM

## 2021-09-28 DIAGNOSIS — R04.0 EPISTAXIS: ICD-10-CM

## 2021-09-28 DIAGNOSIS — J34.89 OTHER SPECIFIED DISORDERS OF NOSE AND NASAL SINUSES: ICD-10-CM

## 2021-09-28 PROCEDURE — 30901 CONTROL OF NOSEBLEED: CPT

## 2021-09-28 PROCEDURE — 99213 OFFICE O/P EST LOW 20 MIN: CPT | Mod: 25

## 2021-09-28 NOTE — HISTORY OF PRESENT ILLNESS
[de-identified] : Judith Peoples is a 81 yo female with hx of HTN who presents with right-sided epistaxis that started on Monday, 9/20. She packed it with gelfoam covered in gauze. She removed it yesterday, but today it occurred again. She has packed it again. She denies headaches or lightheadedness. She denies nasal congestion or drainage. She denies trauma or upper respiratory infection. She denies any fevers or chills.\par \par She states that she had one left-sided episode of epistaxis 20 years ago requiring cauterization. [FreeTextEntry1] : Patient presents for follow-up. She states she had an episode of epistaxis requiring a trip to the emergency room. During that time, her packing fell out. Her epistaxis resolved upon arrival. She was seen by ENT at outside ED who did not see packing in her nose. No intervention was taken. She has not an episode since then. She denies headache, vision changes, lightheadedness.

## 2021-09-28 NOTE — PHYSICAL EXAM
[Nasal Endoscopy Performed] : nasal endoscopy was performed, see procedure section for findings [] : septum deviated bilaterally [Midline] : trachea located in midline position [Normal] : no rashes [de-identified] : Right caudal septum with prominent vessel. No packing noted on endoscopy.

## 2021-11-30 ENCOUNTER — APPOINTMENT (OUTPATIENT)
Dept: NEUROLOGY | Facility: CLINIC | Age: 81
End: 2021-11-30
Payer: MEDICARE

## 2021-11-30 VITALS
BODY MASS INDEX: 38.14 KG/M2 | SYSTOLIC BLOOD PRESSURE: 180 MMHG | WEIGHT: 243 LBS | HEART RATE: 95 BPM | DIASTOLIC BLOOD PRESSURE: 92 MMHG | HEIGHT: 67 IN

## 2021-11-30 PROCEDURE — 99215 OFFICE O/P EST HI 40 MIN: CPT
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
## 2021-11-30 NOTE — REVIEW OF SYSTEMS
[Feeling Tired] : feeling tired [As Noted in HPI] : as noted in HPI [Negative] : Heme/Lymph [Dysuria] : no dysuria [FreeTextEntry2] :  daytime somnolence [FreeTextEntry7] : incontinence [FreeTextEntry8] : frequency [FreeTextEntry9] : joint weakness-unstable left knee

## 2021-11-30 NOTE — CONSULT LETTER
[Dear  ___] : Dear ~NAGA, [Consult Letter:] : I had the pleasure of evaluating your patient, [unfilled]. [Please see my note below.] : Please see my note below. [Consult Closing:] : Thank you very much for allowing me to participate in the care of this patient.  If you have any questions, please do not hesitate to contact me. [Sincerely,] : Sincerely, [DrLatia  ___] : Dr. LEACH [DrLatia ___] : Dr. LEACH [Richard B. Libman, MD, FRCP(C)] : Richard B. Libman, MD, FRCP(C) [Chief, Vascular Neurology] : Chief, Vascular Neurology [Director of Stroke Service, [facility]] : Director of Stroke Service, [facility] [Professor of Neurology] : Professor of Neurology [FreeTextEntry2] : Neftali Biggs M.D. [FreeTextEntry3] : Best Personal Regards,,\par \par Richard B. Libman, MD, FRCPC\par , Neurology \par Co-Director, Stroke Center\par Professor of Neurology\par [facility] [employer] [facility]\par

## 2021-12-22 ENCOUNTER — APPOINTMENT (OUTPATIENT)
Dept: NEUROLOGY | Facility: CLINIC | Age: 81
End: 2021-12-22

## 2022-03-17 ENCOUNTER — APPOINTMENT (OUTPATIENT)
Dept: NEUROLOGY | Facility: CLINIC | Age: 82
End: 2022-03-17
Payer: MEDICARE

## 2022-03-17 PROCEDURE — 95806 SLEEP STUDY UNATT&RESP EFFT: CPT

## 2022-03-17 PROCEDURE — 93892 TCD EMBOLI DETECT W/O INJ: CPT

## 2022-03-17 PROCEDURE — 93880 EXTRACRANIAL BILAT STUDY: CPT

## 2022-03-17 PROCEDURE — 93886 INTRACRANIAL COMPLETE STUDY: CPT

## 2022-03-24 ENCOUNTER — NON-APPOINTMENT (OUTPATIENT)
Age: 82
End: 2022-03-24

## 2022-12-20 NOTE — H&P PST ADULT - BACK
Price (Do Not Change): 0.00 Detail Level: Simple Instructions: This plan will send the code FBSD to the PM system.  DO NOT or CHANGE the price. No deformity or limitation of movement

## 2022-12-22 ENCOUNTER — APPOINTMENT (OUTPATIENT)
Dept: NEUROLOGY | Facility: CLINIC | Age: 82
End: 2022-12-22

## 2023-08-17 ENCOUNTER — APPOINTMENT (OUTPATIENT)
Dept: NEUROLOGY | Facility: CLINIC | Age: 83
End: 2023-08-17

## 2024-02-05 ENCOUNTER — APPOINTMENT (OUTPATIENT)
Dept: NEUROLOGY | Facility: CLINIC | Age: 84
End: 2024-02-05
Payer: MEDICARE

## 2024-02-05 VITALS
SYSTOLIC BLOOD PRESSURE: 180 MMHG | WEIGHT: 232 LBS | HEIGHT: 67 IN | HEART RATE: 77 BPM | BODY MASS INDEX: 36.41 KG/M2 | DIASTOLIC BLOOD PRESSURE: 92 MMHG

## 2024-02-05 DIAGNOSIS — I63.9 CEREBRAL INFARCTION, UNSPECIFIED: ICD-10-CM

## 2024-02-05 PROCEDURE — 99215 OFFICE O/P EST HI 40 MIN: CPT

## 2024-02-05 RX ORDER — HYDRALAZINE HYDROCHLORIDE 25 MG/1
25 TABLET ORAL
Refills: 0 | Status: ACTIVE | COMMUNITY

## 2024-02-05 RX ORDER — OXYCODONE AND ACETAMINOPHEN 2.5; 325 MG/1; MG/1
2.5-325 TABLET ORAL
Qty: 90 | Refills: 0 | Status: DISCONTINUED | COMMUNITY
Start: 2018-10-16 | End: 2024-02-05

## 2024-02-05 RX ORDER — GABAPENTIN 300 MG/1
300 CAPSULE ORAL TWICE DAILY
Refills: 0 | Status: ACTIVE | COMMUNITY

## 2024-02-05 RX ORDER — GABAPENTIN 400 MG/1
400 CAPSULE ORAL
Refills: 0 | Status: ACTIVE | COMMUNITY

## 2024-02-05 NOTE — CONSULT LETTER
[Dear  ___] : Dear ~NAGA, [Consult Letter:] : I had the pleasure of evaluating your patient, [unfilled]. [Please see my note below.] : Please see my note below. [Consult Closing:] : Thank you very much for allowing me to participate in the care of this patient.  If you have any questions, please do not hesitate to contact me. [Sincerely,] : Sincerely, [DrLatia  ___] : Dr. LEACH [DrLatia ___] : Dr. LEACH [Richard B. Libman, MD, FRCP(C)] : Richard B. Libman, MD, FRCP(C) [Chief, Vascular Neurology] : Chief, Vascular Neurology [Director of Stroke Service, [facility]] : Director of Stroke Service, [facility] [Professor of Neurology] : Professor of Neurology [FreeTextEntry2] : Neftali Biggs M.D. [FreeTextEntry3] : Best Personal Regards,,\par  \par  Richard B. Libman, MD, FRCPC\par  , Neurology \par  Co-Director, Stroke Center\par  Professor of Neurology\par  [facility] [employer] [facility]\par

## 2024-02-05 NOTE — PHYSICAL EXAM
[General Appearance - Alert] : alert [General Appearance - In No Acute Distress] : in no acute distress [Impaired Insight] : insight and judgment were intact [Oriented To Time, Place, And Person] : oriented to person, place, and time [Affect] : the affect was normal [Sclera] : the sclera and conjunctiva were normal [PERRL With Normal Accommodation] : pupils were equal in size, round, reactive to light, with normal accommodation [Outer Ear] : the ears and nose were normal in appearance [Extraocular Movements] : extraocular movements were intact [Oropharynx] : the oropharynx was normal [Neck Appearance] : the appearance of the neck was normal [Neck Cervical Mass (___cm)] : no neck mass was observed [Jugular Venous Distention Increased] : there was no jugular-venous distention [Thyroid Diffuse Enlargement] : the thyroid was not enlarged [Thyroid Nodule] : there were no palpable thyroid nodules [Auscultation Breath Sounds / Voice Sounds] : lungs were clear to auscultation bilaterally [Heart Rate And Rhythm] : heart rate was normal and rhythm regular [Heart Sounds] : normal S1 and S2 [Heart Sounds Gallop] : no gallops [Murmurs] : no murmurs [Heart Sounds Pericardial Friction Rub] : no pericardial rub [Arterial Pulses Carotid] : carotid pulses were normal with no bruits [Edema] : there was no peripheral edema [Veins - Varicosity Changes] : there were no varicosital changes [No CVA Tenderness] : no ~M costovertebral angle tenderness [No Spinal Tenderness] : no spinal tenderness [Abnormal Walk] : normal gait [Nail Clubbing] : no clubbing  or cyanosis of the fingernails [Musculoskeletal - Swelling] : no joint swelling seen [Motor Tone] : muscle strength and tone were normal [Skin Color & Pigmentation] : normal skin color and pigmentation [] : no rash [Skin Turgor] : normal skin turgor [FreeTextEntry1] : She looked generally well. Mental status was intact. She was alert, attentive and fully oriented. Speech was fluent and comprehension intact. Memory and fund of knowledge were normal. On cranial nerve exam, I had difficulty seeing the left fundus but the right fundus was within normal limits. The remainder of cranial nerves II through XII was intact. On motor exam tone was normal. All further abnormalities were confined to the left side: arm drift; moderately slow fine finger movements; deltoid 4/5; iliopsoas 2/5 with some discomfort and probably decreased effort; anterior tibialis with mildly decreased range of motion but 4/5; right iliopsoas is 4/5, otherwise, power was within normal limits throughout. Reflexes were 1+ in the right arm, 1+-2+ in the left arm, trace at the right knee, 1+ at the left knee, absent at the right ankle and trace at the left ankle. There were bilateral Babinski signs. Coordination in the arms was intact and was not tested in the legs due to discomfort. She walked well with her walker. Tandem gait and Romberg were not tested. Sensory exam was intact to all modalities.
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
## 2024-09-05 NOTE — BRIEF OPERATIVE NOTE - CONDITION POST OP
stable
Independent in bed mobility- supine<>sit, rolling side<>side observing proper body mechanics, proper positioning, body alignment and precautions.

## 2024-09-30 ENCOUNTER — NON-APPOINTMENT (OUTPATIENT)
Age: 84
End: 2024-09-30

## 2024-10-01 ENCOUNTER — APPOINTMENT (OUTPATIENT)
Dept: NEUROLOGY | Facility: CLINIC | Age: 84
End: 2024-10-01
Payer: MEDICARE

## 2024-10-01 VITALS
DIASTOLIC BLOOD PRESSURE: 83 MMHG | BODY MASS INDEX: 36.41 KG/M2 | HEART RATE: 60 BPM | WEIGHT: 232 LBS | HEIGHT: 67 IN | SYSTOLIC BLOOD PRESSURE: 150 MMHG

## 2024-10-01 PROCEDURE — 99215 OFFICE O/P EST HI 40 MIN: CPT

## 2024-10-01 PROCEDURE — G2211 COMPLEX E/M VISIT ADD ON: CPT

## 2024-10-01 PROCEDURE — G2212 PROLONG OUTPT/OFFICE VIS: CPT

## 2024-10-01 RX ORDER — DANDELION ROOT 525 MG
CAPSULE ORAL
Refills: 0 | Status: ACTIVE | COMMUNITY
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
## 2024-10-01 NOTE — CONSULT LETTER
[Dear  ___] : Dear ~NAGA, [Consult Letter:] : I had the pleasure of evaluating your patient, [unfilled]. [Please see my note below.] : Please see my note below. [Consult Closing:] : Thank you very much for allowing me to participate in the care of this patient.  If you have any questions, please do not hesitate to contact me. [Sincerely,] : Sincerely, [DrLatia  ___] : Dr. LEACH [DrLatia ___] : Dr. LEACH [Richard B. Libman, MD, FRCP(C)] : Richard B. Libman, MD, FRCP(C) [Chief, Vascular Neurology] : Chief, Vascular Neurology [Director of Stroke Service, [facility]] : Director of Stroke Service, [facility] [Professor of Neurology] : Professor of Neurology [FreeTextEntry2] : Neftali Biggs M.D. [FreeTextEntry3] : Best Personal Regards,,\par  \par  Richard B. Libman, MD, FRCPC\par  , Neurology \par  Co-Director, Stroke Center\par  Professor of Neurology\par  [facility] [employer] at [facility]\par
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
## 2024-10-01 NOTE — CONSULT LETTER
[Dear  ___] : Dear ~NAGA, [Consult Letter:] : I had the pleasure of evaluating your patient, [unfilled]. [Please see my note below.] : Please see my note below. [Consult Closing:] : Thank you very much for allowing me to participate in the care of this patient.  If you have any questions, please do not hesitate to contact me. [Sincerely,] : Sincerely, [DrLatia  ___] : Dr. LEACH [DrLatia ___] : Dr. LEACH [Richard B. Libman, MD, FRCP(C)] : Richard B. Libman, MD, FRCP(C) [Chief, Vascular Neurology] : Chief, Vascular Neurology [Director of Stroke Service, [facility]] : Director of Stroke Service, [facility] [Professor of Neurology] : Professor of Neurology [FreeTextEntry2] : Neftali Biggs M.D. [FreeTextEntry3] : Best Personal Regards,,\par  \par  Richard B. Libman, MD, FRCPC\par  , Neurology \par  Co-Director, Stroke Center\par  Professor of Neurology\par  [facility] [employer] [facility] at [facility]\par

## 2024-10-01 NOTE — PHYSICAL EXAM
[General Appearance - In No Acute Distress] : in no acute distress [General Appearance - Alert] : alert [Oriented To Time, Place, And Person] : oriented to person, place, and time [Impaired Insight] : insight and judgment were intact [Affect] : the affect was normal [Sclera] : the sclera and conjunctiva were normal [PERRL With Normal Accommodation] : pupils were equal in size, round, reactive to light, with normal accommodation [Extraocular Movements] : extraocular movements were intact [Outer Ear] : the ears and nose were normal in appearance [Oropharynx] : the oropharynx was normal [Neck Appearance] : the appearance of the neck was normal [Neck Cervical Mass (___cm)] : no neck mass was observed [Jugular Venous Distention Increased] : there was no jugular-venous distention [Thyroid Diffuse Enlargement] : the thyroid was not enlarged [Thyroid Nodule] : there were no palpable thyroid nodules [Auscultation Breath Sounds / Voice Sounds] : lungs were clear to auscultation bilaterally [Heart Rate And Rhythm] : heart rate was normal and rhythm regular [Heart Sounds] : normal S1 and S2 [Heart Sounds Gallop] : no gallops [Murmurs] : no murmurs [Heart Sounds Pericardial Friction Rub] : no pericardial rub [Arterial Pulses Carotid] : carotid pulses were normal with no bruits [Edema] : there was no peripheral edema [Veins - Varicosity Changes] : there were no varicosital changes [No CVA Tenderness] : no ~M costovertebral angle tenderness [No Spinal Tenderness] : no spinal tenderness [Abnormal Walk] : normal gait [Nail Clubbing] : no clubbing  or cyanosis of the fingernails [Musculoskeletal - Swelling] : no joint swelling seen [Motor Tone] : muscle strength and tone were normal [Skin Color & Pigmentation] : normal skin color and pigmentation [Skin Turgor] : normal skin turgor [] : no rash [FreeTextEntry1] : She looked generally well. Mental status was intact. She was alert, attentive and fully oriented. Speech was fluent and comprehension intact. There were very mild word finding pauses. She repeated the same statement numerous times during the visit. Memory and fund of knowledge were normal. On cranial nerve exam, I had difficulty seeing the left fundus but the right fundus was within normal limits. The remainder of cranial nerves II through XII was intact. On motor exam tone was normal. All further abnormalities were confined to the left side: arm drift; moderately slow fine finger movements; deltoid 4/5; iliopsoas 2/5 with some discomfort and probably decreased effort; anterior tibialis with mildly decreased range of motion but 4/5; right iliopsoas is 4/5, otherwise, power was within normal limits throughout. Reflexes were 1+ in the right arm, 1+-2+ in the left arm, trace at the right knee, 1+ at the left knee, absent at the right ankle and trace at the left ankle. There were bilateral Babinski signs. Coordination in the arms was intact and was not tested in the legs due to discomfort. She walked well with her walker. Tandem gait and Romberg were not tested. Sensory exam was intact to all modalities.

## 2024-10-30 ENCOUNTER — APPOINTMENT (OUTPATIENT)
Dept: NEUROLOGY | Facility: CLINIC | Age: 84
End: 2024-10-30
Payer: MEDICARE

## 2024-10-30 PROCEDURE — 93886 INTRACRANIAL COMPLETE STUDY: CPT

## 2024-10-30 PROCEDURE — 93892 TCD EMBOLI DETECT W/O INJ: CPT

## 2024-10-30 PROCEDURE — 93880 EXTRACRANIAL BILAT STUDY: CPT

## 2025-04-21 ENCOUNTER — APPOINTMENT (OUTPATIENT)
Dept: NEUROSURGERY | Facility: CLINIC | Age: 85
End: 2025-04-21
Payer: MEDICARE

## 2025-04-21 ENCOUNTER — NON-APPOINTMENT (OUTPATIENT)
Age: 85
End: 2025-04-21

## 2025-04-21 VITALS
BODY MASS INDEX: 36.41 KG/M2 | WEIGHT: 232 LBS | SYSTOLIC BLOOD PRESSURE: 178 MMHG | HEART RATE: 51 BPM | HEIGHT: 67 IN | DIASTOLIC BLOOD PRESSURE: 73 MMHG | OXYGEN SATURATION: 95 %

## 2025-04-21 DIAGNOSIS — M25.562 PAIN IN LEFT KNEE: ICD-10-CM

## 2025-04-21 DIAGNOSIS — G89.29 PAIN IN LEFT KNEE: ICD-10-CM

## 2025-04-21 PROCEDURE — 99204 OFFICE O/P NEW MOD 45 MIN: CPT

## 2025-04-23 RX ORDER — GLUCOSAMINE/MSM/CHONDROIT SULF 500-166.6
10 TABLET ORAL
Refills: 0 | Status: ACTIVE | COMMUNITY

## 2025-04-23 RX ORDER — BERBERINE CHLOR/SEAWEED/CHROM 500-250 MG
CAPSULE ORAL
Refills: 0 | Status: ACTIVE | COMMUNITY

## 2025-04-23 RX ORDER — METHYLDOPA/HYDROCHLOROTHIAZIDE 250MG-25MG
TABLET ORAL
Refills: 0 | Status: ACTIVE | COMMUNITY

## 2025-04-23 RX ORDER — METHYLSULFONYLMETHANE 1000 MG
1000 TABLET ORAL
Refills: 0 | Status: ACTIVE | COMMUNITY

## 2025-04-23 RX ORDER — ACETAMINOPHEN 650 MG/1
650 TABLET, EXTENDED RELEASE ORAL
Refills: 0 | Status: ACTIVE | COMMUNITY

## 2025-04-23 RX ORDER — BENZOCAINE/BENZALKONIUM/ALOE/E 5 %-0.13 %
CREAM (GRAM) TOPICAL
Refills: 0 | Status: ACTIVE | COMMUNITY

## 2025-07-15 NOTE — PRE-OP CHECKLIST - SIDE RAILS UP
Orthopedic Spine Surgery      Diagnosis:       ICD-10-CM    1. Cervical myelopathy (HCC)  G95.9       2. Lumbar radiculopathy  M54.16 XR LUMBAR SPINE (MIN 4 VIEWS)      3. Cervical radiculopathy  M54.12 XR CERVICAL SPINE (4-5 VIEWS)          Assessment / Plan:   58 y.o. female with complaints of progressive bilateral hand numbness and loss of dexterity concerning for component of cervical myelopathy.  Radiographs evidencing multilevel degenerative disc disease.  Given progressive nature of symptoms recommend further assessment with MRI of the cervical spine.    Patient also reporting severe left-sided groin and anterolateral thigh pain.  This appears to be more consistent with hip osteoarthritis than lumbar radiculopathy.  Lumbar radiographs as well as CT of the abdomen pelvis evidencing end-stage left-sided hip osteoarthritis.  Pain exactly reduced by hip impingement testing.  Recommend referral to Dr. Bah for assessment for total hip arthroplasty.    History:     Chief Complaint:   Hand numbness, loss of dexterity  Left-sided groin pain    History of Present Illness:   Catrachita Pulido is a 58 y.o. female who presents to clinic for evaluation of the above complaints. Symptoms began over the past several years and have been progressively worsening.  In regards to her upper extremities she has neck pain with some radiation down the arms and into the hands.  She also reports bilateral hand numbness which is persistent throughout the day and is progressive over the past several months.  She feels unsteady on her feet.  She does feel that she has some weakness in her hands.  She feels that her dexterity is becoming more of a problem for her.    She also reports severe left-sided leg pain primarily in the groin region.  Pain does radiate into the anterolateral thigh.  Pain is worse with weightbearing.  Pain is worse with range of motion of the hip.    Primary Care Provider:   Susie Gonsales, APRN - NP    Referring 
done
redness to breast